# Patient Record
Sex: FEMALE | Race: BLACK OR AFRICAN AMERICAN | Employment: FULL TIME | URBAN - METROPOLITAN AREA
[De-identification: names, ages, dates, MRNs, and addresses within clinical notes are randomized per-mention and may not be internally consistent; named-entity substitution may affect disease eponyms.]

---

## 2017-08-04 ENCOUNTER — HOSPITAL ENCOUNTER (OUTPATIENT)
Dept: MAMMOGRAPHY | Age: 47
Discharge: HOME | End: 2017-08-04
Attending: FAMILY MEDICINE
Payer: COMMERCIAL

## 2017-08-04 DIAGNOSIS — Z12.31 VISIT FOR SCREENING MAMMOGRAM: ICD-10-CM

## 2017-08-04 PROCEDURE — 77067 SCR MAMMO BI INCL CAD: CPT

## 2017-10-09 ENCOUNTER — HOSPITAL ENCOUNTER (OUTPATIENT)
Dept: MAMMOGRAPHY | Age: 47
Discharge: HOME | End: 2017-10-09
Attending: PHYSICIAN ASSISTANT
Payer: COMMERCIAL

## 2017-10-09 ENCOUNTER — HOSPITAL ENCOUNTER (OUTPATIENT)
Dept: ULTRASOUND IMAGING | Age: 47
Discharge: HOME | End: 2017-10-09
Attending: PHYSICIAN ASSISTANT
Payer: COMMERCIAL

## 2017-10-09 DIAGNOSIS — R92.8 ABNORMAL MAMMOGRAM: ICD-10-CM

## 2017-10-09 DIAGNOSIS — N64.89 BREAST ASYMMETRY: ICD-10-CM

## 2017-10-09 PROCEDURE — 77065 DX MAMMO INCL CAD UNI: CPT

## 2017-12-21 ENCOUNTER — HOSPITAL ENCOUNTER (OUTPATIENT)
Dept: ULTRASOUND IMAGING | Age: 47
Discharge: HOME | End: 2017-12-21
Attending: FAMILY MEDICINE
Payer: COMMERCIAL

## 2017-12-21 DIAGNOSIS — N39.0 UTI (URINARY TRACT INFECTION): ICD-10-CM

## 2017-12-21 DIAGNOSIS — R10.9 FLANK PAIN: ICD-10-CM

## 2017-12-21 DIAGNOSIS — R31.9 HEMATURIA: ICD-10-CM

## 2017-12-21 PROCEDURE — 76770 US EXAM ABDO BACK WALL COMP: CPT

## 2017-12-22 ENCOUNTER — HOSPITAL ENCOUNTER (OUTPATIENT)
Dept: GENERAL RADIOLOGY | Age: 47
Discharge: HOME | End: 2017-12-22
Payer: COMMERCIAL

## 2017-12-22 DIAGNOSIS — J90 PLEURAL EFFUSION: ICD-10-CM

## 2017-12-22 DIAGNOSIS — R10.9 FLANK PAIN: ICD-10-CM

## 2017-12-22 PROCEDURE — 71020 XR CHEST PA LAT: CPT

## 2017-12-28 ENCOUNTER — HOSPITAL ENCOUNTER (OUTPATIENT)
Dept: CT IMAGING | Age: 47
Discharge: HOME | End: 2017-12-28
Attending: FAMILY MEDICINE | Admitting: FAMILY MEDICINE
Payer: COMMERCIAL

## 2017-12-28 DIAGNOSIS — R07.9 CHEST PAIN: ICD-10-CM

## 2017-12-28 DIAGNOSIS — J90 PLEURAL EFFUSION: ICD-10-CM

## 2017-12-28 PROCEDURE — 74011636320 HC RX REV CODE- 636/320: Performed by: FAMILY MEDICINE

## 2017-12-28 PROCEDURE — 71275 CT ANGIOGRAPHY CHEST: CPT

## 2017-12-28 RX ADMIN — IOPAMIDOL 100 ML: 755 INJECTION, SOLUTION INTRAVENOUS at 09:05

## 2018-08-07 ENCOUNTER — HOSPITAL ENCOUNTER (OUTPATIENT)
Dept: MAMMOGRAPHY | Age: 48
Discharge: HOME | End: 2018-08-07
Attending: FAMILY MEDICINE
Payer: COMMERCIAL

## 2018-08-07 DIAGNOSIS — Z12.31 VISIT FOR SCREENING MAMMOGRAM: ICD-10-CM

## 2018-08-07 PROCEDURE — 77067 SCR MAMMO BI INCL CAD: CPT

## 2019-01-11 ENCOUNTER — HOSPITAL ENCOUNTER (OUTPATIENT)
Dept: GENERAL RADIOLOGY | Age: 49
Discharge: HOME | End: 2019-01-11
Payer: COMMERCIAL

## 2019-01-11 DIAGNOSIS — M79.622 LEFT UPPER ARM PAIN: ICD-10-CM

## 2019-01-11 PROCEDURE — 73060 X-RAY EXAM OF HUMERUS: CPT

## 2019-06-25 ENCOUNTER — HOSPITAL ENCOUNTER (OUTPATIENT)
Dept: GENERAL RADIOLOGY | Age: 49
Discharge: HOME | End: 2019-06-25
Payer: COMMERCIAL

## 2019-06-25 DIAGNOSIS — M25.562 LEFT KNEE PAIN: ICD-10-CM

## 2019-06-25 DIAGNOSIS — M25.561 RIGHT KNEE PAIN: ICD-10-CM

## 2019-06-25 PROCEDURE — 73564 X-RAY EXAM KNEE 4 OR MORE: CPT

## 2019-07-16 ENCOUNTER — OFFICE VISIT (OUTPATIENT)
Dept: ORTHOPEDIC SURGERY | Facility: CLINIC | Age: 49
End: 2019-07-16

## 2019-07-16 VITALS
OXYGEN SATURATION: 99 % | WEIGHT: 277 LBS | DIASTOLIC BLOOD PRESSURE: 94 MMHG | HEART RATE: 57 BPM | HEIGHT: 70 IN | BODY MASS INDEX: 39.65 KG/M2 | SYSTOLIC BLOOD PRESSURE: 161 MMHG | TEMPERATURE: 98 F | RESPIRATION RATE: 16 BRPM

## 2019-07-16 DIAGNOSIS — M17.0 PRIMARY OSTEOARTHRITIS OF BOTH KNEES: Primary | ICD-10-CM

## 2019-07-16 DIAGNOSIS — M72.2 PLANTAR FASCIITIS OF RIGHT FOOT: ICD-10-CM

## 2019-07-16 RX ORDER — TRIAMCINOLONE ACETONIDE 40 MG/ML
40 INJECTION, SUSPENSION INTRA-ARTICULAR; INTRAMUSCULAR ONCE
Qty: 1 VIAL | Refills: 0
Start: 2019-07-16 | End: 2019-07-16

## 2019-07-16 NOTE — PROGRESS NOTES
Patient: Noelle Muñoz                MRN: [id number]       SSN: [id number]  YOB: 1970        AGE: 51 y.o. SEX: female  Body mass index is 39.75 kg/m². PCP: NAZARIO Levy  07/16/19    Chief Complaint: Bilateral knee pain    HISTORY OF PRESENT ILLNESS:  Lisa Angeles is a 51year-old female who presents to the office today with bilateral knee pain. She rates the pain as 6/10. She has had pain for a few years. She denies any surgeries or specific injuries. She has had x-rays in the past.  She has also had cortisone shots in the past, but none since December. She has not had hyaluronic acid injections. She has pain worse when she walks or stands for long periods of time. She works as a teacher. History reviewed. No pertinent past medical history. History reviewed. No pertinent family history. Current Outpatient Medications   Medication Sig Dispense Refill    triamcinolone acetonide (KENALOG-40) 40 mg/mL injection 1 mL by Intra artICUlar route once for 1 dose. 1 Vial 0       No Known Allergies    History reviewed. No pertinent surgical history.     Social History     Socioeconomic History    Marital status:      Spouse name: Not on file    Number of children: Not on file    Years of education: Not on file    Highest education level: Not on file   Occupational History    Not on file   Social Needs    Financial resource strain: Not on file    Food insecurity:     Worry: Not on file     Inability: Not on file    Transportation needs:     Medical: Not on file     Non-medical: Not on file   Tobacco Use    Smoking status: Never Smoker    Smokeless tobacco: Never Used   Substance and Sexual Activity    Alcohol use: Not on file    Drug use: Not on file    Sexual activity: Not on file   Lifestyle    Physical activity:     Days per week: Not on file     Minutes per session: Not on file    Stress: Not on file   Relationships    Social connections:     Talks on phone: Not on file     Gets together: Not on file     Attends [employer] service: Not on file     Active member of club or organization: Not on file     Attends meetings of clubs or organizations: Not on file     Relationship status: Not on file    Intimate partner violence:     Fear of current or ex partner: Not on file     Emotionally abused: Not on file     Physically abused: Not on file     Forced sexual activity: Not on file   Other Topics Concern    Not on file   Social History Narrative    Not on file       REVIEW OF SYSTEMS:      CON: negative for recent weight loss/gain, fever, or chills  EYE: negative for double or blurry vision  ENT: negative for hoarseness  RS:   negative for cough, URI, SOB  CV:  negative for chest pain, palpitations  GI:    negative for blood in stool, nausea/vomiting  :  negative for blood in urine  MS: As per HPI  Other systems reviewed and noted below. PHYSICAL EXAMINATION:  Visit Vitals  BP (!) 161/94   Pulse (!) 57   Temp 98 °F (36.7 °C) (Oral)   Resp 16   Ht 5' 10\" (1.778 m)   Wt 277 lb (125.6 kg)   SpO2 99%   BMI 39.75 kg/m²     Body mass index is 39.75 kg/m². GENERAL: Alert and oriented x3, in no acute distress, well-developed, well-nourished. HEENT: Normocephalic, atraumatic. RESP: Non labored breathing with equal chest rise on inspiration. CV: Well perfused extremities. No cyanosis or clubbing noted. ABDOMEN: Soft, non-tender, non-distended. PHYSICAL EXAM:  Physical exam of both knees with full knee range of motion. Mild crepitus on the left. No crepitus on the right. Mild tenderness to palpation globally about the knee. No pain with Shaheen's. No instability. No pain with varus or valgus stress. IMAGING:  X-rays were reviewed. These are non-standing x-rays of both knees, which show arthritic changes in both knees. ASSESSMENT AND PLAN:   Ave Cardenas is a 51year-old female with bilateral knee arthritis.   I had a lengthy discussion with her regarding treatment options today. She would like to try another round of cortisone shots into each knee. We will also put in for hyaluronic acid injections if these should fail. I will plan to see her back as needed. She also likely has some right plantar fasciitis. I gave her some information on this, as well as Dr. Bailey Alicia' card.               Electronically signed by: Vince Mohs, MD

## 2019-07-17 NOTE — PROGRESS NOTES
Addendum:  Patient has x rays which show complete loss of medial joint space with osteophyte formation on the left knee and x rays of the right knee show complete loss of medial joint space with osteophyte formation. She has taken NSAIDs for 2-3 years without resolution of her symptoms/pain. Previous cortisone injections given in the past have not lasted longer than 2 months duration for symptoms relief.

## 2019-08-06 ENCOUNTER — OFFICE VISIT (OUTPATIENT)
Dept: ORTHOPEDIC SURGERY | Age: 49
End: 2019-08-06

## 2019-08-06 VITALS
BODY MASS INDEX: 39.22 KG/M2 | WEIGHT: 274 LBS | OXYGEN SATURATION: 99 % | DIASTOLIC BLOOD PRESSURE: 80 MMHG | HEART RATE: 56 BPM | HEIGHT: 70 IN | TEMPERATURE: 97.1 F | RESPIRATION RATE: 16 BRPM | SYSTOLIC BLOOD PRESSURE: 133 MMHG

## 2019-08-06 DIAGNOSIS — S90.111A CONTUSION OF RIGHT GREAT TOE WITHOUT DAMAGE TO NAIL, INITIAL ENCOUNTER: Primary | ICD-10-CM

## 2019-08-06 DIAGNOSIS — M76.61 ACHILLES TENDINITIS OF BOTH LOWER EXTREMITIES: ICD-10-CM

## 2019-08-06 DIAGNOSIS — M76.62 ACHILLES TENDINITIS OF BOTH LOWER EXTREMITIES: ICD-10-CM

## 2019-08-06 DIAGNOSIS — M79.672 PAIN OF LEFT HEEL: ICD-10-CM

## 2019-08-06 DIAGNOSIS — E66.01 SEVERE OBESITY (HCC): ICD-10-CM

## 2019-08-06 DIAGNOSIS — M79.674 GREAT TOE PAIN, RIGHT: ICD-10-CM

## 2019-08-06 RX ORDER — ACETAMINOPHEN 500 MG
TABLET ORAL
COMMUNITY

## 2019-08-06 NOTE — PROGRESS NOTES
I educated patient on calf stretching for her plantar fasciitis. Demonstrated all exercises in the exam room. Due to her toe injury, she is unable to complete heel raises at this time. Fitted for hard sole shoe and instructed on application and use. Advised patient to ice for pain if helpful. Patient verbally understands and consents to treatment plan.

## 2019-08-06 NOTE — PROGRESS NOTES
AMBULATORY PROGRESS NOTE      Patient: Ness Rodriguez             MRN: [id number]     SSN: [id number] Body mass index is 39.31 kg/m². YOB: 1970     AGE: 51 y.o. SEX: female    PCP: NAZARIO Redd     IMPRESSION/DIAGNOSIS AND TREATMENT PLAN     DIAGNOSES  1. Contusion of right great toe without damage to nail, initial encounter    2. Achilles tendinitis of both lower extremities    3. Great toe pain, right    4. Pain of left heel    5. Severe obesity ([address]        Orders Placed This Encounter    AMB SUPPLY ORDER    [73487] Foot Min 3V    [74682] Foot Min 3V      Ness Rodriguez understands her diagnoses and the proposed plan. X-rays of the right foot, AP, lateral, and oblique, show a moderate bunion deformity. No acute fracture, no subluxation, and no dislocation. No osteolytic or osteoblastic lesions are seen on these three views of the right foot. There is some planovalgus alignment to this right foot. Three views of the left foot show planovalgus alignment. No fracture, subluxation, or dislocation. No osteolytic or osteoblastic lesions are seen. There is an incidental bone spur seen to the inferior surface on the right foot lateral radiographic x-ray compared to that on the left side, which shows no incidental bone spur on the left side. Plan:    1) DME Order: Right hard sole shoe. 2) ALBERTO Mojica [employer], has educated the patient on Achilles tendon and plantar fascia exercises and/or stretches. 3) Take OTC Tylenol as needed for pain. 4) Continue activity modification as directed. RTO - 2 weeks     HPI AND EXAMINATION     Ness Rodriguez IS A 51 y.o. female who presents to my outpatient office complaining of right foot pain. Ms. Balta Weiner reports that she is experiencing two issues.  First, she reports that on July 16th, she saw Dr. Sandeep Cardoso and mentioned that she was experiencing pain in her bilateral heels whenever she would wake in the morning. He noted that she might have plantar fasciitis and referred her to me. She indicates that she experiences the pain more over her Achilles tendons rather than her plantar fascia. The patient denies any recent falls, twists, or trauma prior to the onset of her heel pain. She states that she mostly only experiences the pain when she first wakes in the mornings. Then, on the 23rd, the patient states that she stubbed her right great toe on a step. She notes that the swelling has improved, however, she still experiences pain and swelling. She has been elevating and icing her great toe. She has also been taking OTC Tylenol for her pain. She finds that wearing closed toe shoes causes discomfort. 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 XR imaging has been reviewed with the patient. Date of injury: 07/23/2019. The patient has h/o gastric bypass. Visit Vitals  /80   Pulse (!) 56   Temp 97.1 °F (36.2 °C) (Oral)   Resp 16   Ht 5' 10\" (1.778 m)   Wt 274 lb (124.3 kg)   SpO2 99%   BMI 39.31 kg/m²     Appearance: Alert, well appearing and pleasant patient who is in no distress, oriented to person, place/time, and who follows commands. This patient is accompanied in the examination room by her self. Dementia: no dementia  Psychiatric: Affect and mood are appropriate. Patient arrives to office via: without assistive device  HEENT: Head normocephalic & atraumatic. Both pupils are round, non icteric sclera   Eye: EOM are intact and sclera are clear    Neck: ROM WNL and JVD neck is not present     Hearings Intact, does not require hearing aid device  Respiratory: Breathing is unlabored without accessory chest muscle use  Cardiovascular/Peripheral Vascular: Normal Pulses to each foot    ANKLE/FOOT right    Gait: Normal  Tenderness: Moderate tenderness to the great toe, proximal phalanx. NT to the plantar fasciitis at this time. NT to the Achilles tendon at this time.    Cutaneous: Mild swelling to the proximal phalanx of the great toe. Joint Motion: Can DF just to neutral     Gastrocnemius tightness is present  Joint / Tendon Stability: No Ankle or Subtalar instability or joint laxity. No peroneal sublux ability or dislocation  Alignment: Bunion deformity of the bilateral great toes. Neuro Motor/Sensory: NL/NL. Vascular: NL foot/ankle pulses. Lymphatics: No extremity lymphedema, No calf swelling, no tenderness to calf muscles. Left FOOT/ANKLE  Integumentary: No rashes, skin patches, wounds, or abrasions to the right or left legs       Warm and normal color. No regions of expressible drainage. Palpable fullness or mass is not present      Gait: Limp with gait is not present       Tenderness: No PLANTAR FASCIA REGION, with no NODULARITIES at this time        NT to the Achilles tendon      Motor/Strength/Tone Exam: Normal DF, INV,EVERSION. Slightly diminished PF           strength due to plantar fascial tenderness      Sensory Exam:   Intact Normal Sensation to ankle/foot      Stability Testing: No anterolateral or varus instability of the Ankle or Subtalar Joints               No peroneal tendon instability noted      ROM: Normal ROM noted to ankle/foot      Contractures: No Achilles or Gastrocnemius Contractures      Calf tenderness: Absent for calf or gastrocnemius muscle regions       Soft, supple, non tender, non taut lower extremity compartments       Alignment: Neutral Hindfoot  Wounds/Abrasions:   None present  Extremities:   No embolic phenomena to the toes or hands         No significant edema to the foot and or toes.         Lower extremities are warm and appear well perfused    DVT: No evidence of DVT seen on examination at this time    No calf swelling, no tenderness to calf muscles  Lymphatic:  No Evidence of Lymphedema  Vascular: Medial Border of Tibia Region: Edema is not present        Pulses: Dorsalis Pedis &  Posterior Tibial Pulses : Palpable yes        Varicosities Lower Limbs :  None    Neuro: Negative bilateral Straight leg raise (seated position)   no Tinel's at PM NV Bundle Tarsal Canal   no Proximal Tarsal Tunnel Tenderness    no Distal Tarsal Tunnel Tenderness    See Musculoskeletal section for pertinent individual extremity examination    No abnormal hand/wrist, foot/ankle, or facial/neck tremors. CHART REVIEW     No past medical history on file. Current Outpatient Medications   Medication Sig    acetaminophen (TYLENOL) 500 mg tablet Take  by mouth every six (6) hours as needed for Pain. No current facility-administered medications for this visit. No Known Allergies  No past surgical history on file. Social History     Occupational History    Not on file   Tobacco Use    Smoking status: Never Smoker    Smokeless tobacco: Never Used   Substance and Sexual Activity    Alcohol use: Not on file    Drug use: Not on file    Sexual activity: Not on file     No family history on file. REVIEW OF SYSTEMS : 8/6/2019  ALL BELOW ARE Negative except : SEE HPI     Constitutional: Negative for fever, chills and weight loss. Neg Weight Loss  Cardiovascular: Negative for chest pain, claudication and leg swelling. SOB, [place]   Gastrointestinal/Urological: Negative for  pain, N/V/D/C, Blood in stool or urine,dysuria                         Hematuria, Incontinence, pelvic pain  Musculoskeletal: see HPI. Neurological: Negative for dizziness and weakness, headaches,Visual Changes             Confusion,  Or Seizures,   Psychiatric/Behavioral: Negative for depression, memory loss and substance abuse. Extremities:  Negative for hair changes, rash or skin lesion changes. Hematologic: Negative for Bleeding problems, bruising, pallor or swollen lymph nodes.   Peripheral Vascular: No calf pain, vascular vein tenderness to calf pain              No calf throbbing, posterior knee throbbing pain     DIAGNOSTIC IMAGING     No notes on file    Please see above section of this report. I have personally reviewed the results of the above study. The interpretation of this study is my professional opinion. Written by Dolorse Be, as dictated by Dr. Flash Shelton. I, Dr. Flash Shelton, confirm that all documentation is accurate.

## 2019-08-06 NOTE — PATIENT INSTRUCTIONS
Achilles Tendinitis: Exercises     Complete at least 2 sessions of each exercise each day to get started (no days off). If beneficial and able to fit into your schedule, more sessions are recommended. Nothing should cause an increase in pain or swelling. Towel stretch (calf)    1. Sit with your legs extended and knees straight. 2. Place a towel around your foot just under the toes. 3. Hold each end of the towel in each hand, with your hands above your knees. 4. Pull back with the towel so that your foot stretches toward you. 5. Hold the position for 30 seconds. 6. Repeat 3 times a session. 7. When 3 sets are completed, slightly bend the knee by placing a towel or pillow under it and completing 3 more sets per leg. 8. If stretch becomes too easy and you are no longer able to feel a stretch, discontinue exercise in favor of standing stretches. Wall stretch (calf)    1. Stand facing a wall with your hands on the wall at about eye level. Put your affected leg about a step behind your other leg. 2. Keeping your back leg straight and your back heel on the floor, bend your front knee and gently bring your hip and chest toward the wall until you feel a stretch in the calf of your back leg. 3. Hold the stretch for 30 seconds. 4. Repeat 3 times per leg. 5. Repeat steps 1 through 4, but this time keep your back knee bent. Calf stair stretch      1. Standing on a stair so that the edge of the stair hits the midfoot, let one foot drop down below the stair so that a stretch is felt in the back of the lower leg. Keep knee straight. 2. Hold for 30 seconds, 3 times per leg. 3. Repeat the stretch with your knee slightly bent. Heel raises (eccentric emphasis)    1. Stand on a step with your heel off the edge of the step. Hold on to a handrail or wall for balance. 2. Push up on your toes, then slowly count to 5 as you lower yourself back down until your heel is below the step.  If it hurts to push up on your toes, try putting most of your weight on your other foot as you push up, or try using your arms to help you. If you can't do this exercise without causing pain, stop the exercise. 3. Repeat 10 times for 3 sets. If you have any questions, please call [facility] and ask for Natasha Rojo, Certified Athletic Trainer.

## 2019-08-09 ENCOUNTER — HOSPITAL ENCOUNTER (OUTPATIENT)
Dept: MAMMOGRAPHY | Age: 49
Discharge: HOME | End: 2019-08-09
Attending: PHYSICIAN ASSISTANT
Payer: COMMERCIAL

## 2019-08-09 DIAGNOSIS — Z12.31 VISIT FOR SCREENING MAMMOGRAM: ICD-10-CM

## 2019-08-09 PROCEDURE — 77067 SCR MAMMO BI INCL CAD: CPT

## 2019-09-10 ENCOUNTER — OFFICE VISIT (OUTPATIENT)
Dept: ORTHOPEDIC SURGERY | Facility: CLINIC | Age: 49
End: 2019-09-10

## 2019-09-10 VITALS
BODY MASS INDEX: 39.14 KG/M2 | OXYGEN SATURATION: 99 % | SYSTOLIC BLOOD PRESSURE: 149 MMHG | RESPIRATION RATE: 18 BRPM | WEIGHT: 273.4 LBS | TEMPERATURE: 97.3 F | HEART RATE: 55 BPM | HEIGHT: 70 IN | DIASTOLIC BLOOD PRESSURE: 69 MMHG

## 2019-09-10 DIAGNOSIS — M17.0 PRIMARY OSTEOARTHRITIS OF BOTH KNEES: Primary | ICD-10-CM

## 2019-09-10 RX ORDER — HYALURONATE SODIUM 10 MG/ML
2 SYRINGE (ML) INTRAARTICULAR ONCE
Qty: 2 ML | Refills: 0
Start: 2019-09-10 | End: 2019-09-10

## 2019-09-10 NOTE — PROGRESS NOTES
This note has been dictated below. Patient: Manisha Mejia                MRN: [id number]       SSN: [id number]  YOB: 1970        AGE: 51 y.o. SEX: female  Body mass index is 39.23 kg/m². PCP: NAZARIO Reis  09/10/19    History reviewed. No pertinent past medical history. History reviewed. No pertinent surgical history. History reviewed. No pertinent family history.     Social History     Socioeconomic History    Marital status:      Spouse name: Not on file    Number of children: Not on file    Years of education: Not on file    Highest education level: Not on file   Occupational History    Not on file   Social Needs    Financial resource strain: Not on file    Food insecurity:     Worry: Not on file     Inability: Not on file    Transportation needs:     Medical: Not on file     Non-medical: Not on file   Tobacco Use    Smoking status: Never Smoker    Smokeless tobacco: Never Used   Substance and Sexual Activity    Alcohol use: Not on file    Drug use: Not on file    Sexual activity: Not on file   Lifestyle    Physical activity:     Days per week: Not on file     Minutes per session: Not on file    Stress: Not on file   Relationships    Social connections:     Talks on phone: Not on file     Gets together: Not on file     Attends [employer] service: Not on file     Active member of club or organization: Not on file     Attends meetings of clubs or organizations: Not on file     Relationship status: Not on file    Intimate partner violence:     Fear of current or ex partner: Not on file     Emotionally abused: Not on file     Physically abused: Not on file     Forced sexual activity: Not on file   Other Topics Concern    Not on file   Social History Narrative    Not on file       Current Outpatient Medications   Medication Sig Dispense Refill    sodium hyaluronate (SUPARTZ FX/HYALGAN/GENIVSC) 10 mg/mL syrg injection 2 mL by Intra artICUlar route once for 1 dose. 2 mL 0    acetaminophen (TYLENOL) 500 mg tablet Take  by mouth every six (6) hours as needed for Pain. No Known Allergies      REVIEW OF SYSTEMS:      Review of systems unchanged from previous visit except as noted below. PHYSICAL EXAMINATION:  Visit Vitals  /69 (BP 1 Location: Left arm, BP Patient Position: Sitting)   Pulse (!) 55   Temp 97.3 °F (36.3 °C) (Oral)   Resp 18   Ht 5' 10\" (1.778 m)   Wt 273 lb 6.4 oz (124 kg)   SpO2 99% Comment: RA   BMI 39.23 kg/m²     Body mass index is 39.23 kg/m². HISTORY OF PRESENT ILLNESS:  Antonio Escamilla returns to the office today for her knees. She is here today for Euflexxa injection #1 into her bilateral knees. She has no new complaints. PHYSICAL EXAM:  Exam of both knees with no effusion, warmth or erythema. Mild swelling superficially. Full knee range of motion. ASSESSMENT AND PLAN:   Antonio Escamilla underwent Euflexxa injection #1 into both knees today. She tolerated it well. Follow up next week.         [facility]  OFFICE PROCEDURE PROGRESS NOTE        Chart reviewed for the following:   Patrick Hinds MD, have reviewed the History, Physical and updated the Allergic reactions for [address] performed immediately prior to start of procedure:   Patrick Hinds MD, have performed the following reviews on [facility] prior to the start of the procedure:            * Patient was identified by name and date of birth   * Agreement on procedure being performed was verified  * Risks and Benefits explained to the patient  * Procedure site verified and marked as necessary  * Patient was positioned for comfort  * Consent was signed and verified    Time: 1640      Date of procedure: 9/10/2019    Procedure performed by:  Simone Urrutia MD    Provider assisted by: Bennett Robles LPN    Patient assisted by: self    How tolerated by patient: tolerated the procedure well with no complications    Post Procedural Pain Scale: 0 - No Hurt    Comments: none                     Electronically signed by: Lainey Armstrong MD

## 2019-09-10 NOTE — PROGRESS NOTES
1. Have you been to the ER, urgent care clinic since your last visit? Hospitalized since your last visit? No    2. Have you seen or consulted any other health care providers outside of the [address] [place] [address] since your last visit? Include any pap smears or colon screening.  No

## 2019-09-17 ENCOUNTER — OFFICE VISIT (OUTPATIENT)
Dept: ORTHOPEDIC SURGERY | Facility: CLINIC | Age: 49
End: 2019-09-17

## 2019-09-17 VITALS
DIASTOLIC BLOOD PRESSURE: 69 MMHG | TEMPERATURE: 97.3 F | HEART RATE: 58 BPM | SYSTOLIC BLOOD PRESSURE: 139 MMHG | WEIGHT: 273 LBS | RESPIRATION RATE: 16 BRPM | HEIGHT: 70 IN | OXYGEN SATURATION: 99 % | BODY MASS INDEX: 39.08 KG/M2

## 2019-09-17 DIAGNOSIS — M17.0 PRIMARY OSTEOARTHRITIS OF BOTH KNEES: Primary | ICD-10-CM

## 2019-09-17 RX ORDER — HYALURONATE SODIUM 10 MG/ML
2 SYRINGE (ML) INTRAARTICULAR ONCE
Qty: 2 ML | Refills: 0
Start: 2019-09-17 | End: 2019-09-17

## 2019-09-17 RX ORDER — DICLOFENAC SODIUM 10 MG/G
GEL TOPICAL 4 TIMES DAILY
COMMUNITY

## 2019-09-17 NOTE — PROGRESS NOTES
1. Have you been to the ER, urgent care clinic since your last visit? Hospitalized since your last visit? NO    2. Have you seen or consulted any other health care providers outside of the [address] [place] [address] since your last visit? Include any pap smears or colon screening.  NO

## 2019-09-17 NOTE — PROGRESS NOTES
Patient: Manisha Mejia                MRN: [id number]       SSN: [id number]  YOB: 1970        AGE: 51 y.o. SEX: female  Body mass index is 39.17 kg/m². PCP: NAZARIO Reis  09/17/19    Chief Complaint: Euflexxa #2 Bilateral    HISTORY OF PRESENT ILLNESS:  Marlena Steinberg returns to the office for bilateral Euflexxa injections #2. She says the left knee did not get much in the way of relief, but the right knee has felt better. No other issues. History reviewed. No pertinent past medical history. History reviewed. No pertinent family history. Current Outpatient Medications   Medication Sig Dispense Refill    diclofenac (VOLTAREN) 1 % gel Apply  to affected area four (4) times daily.  sodium hyaluronate (SUPARTZ FX/HYALGAN/GENIVSC) 10 mg/mL syrg injection 2 mL by Intra artICUlar route once for 1 dose. 2 mL 0    acetaminophen (TYLENOL) 500 mg tablet Take  by mouth every six (6) hours as needed for Pain. No Known Allergies    History reviewed. No pertinent surgical history.     Social History     Socioeconomic History    Marital status:      Spouse name: Not on file    Number of children: Not on file    Years of education: Not on file    Highest education level: Not on file   Occupational History    Not on file   Social Needs    Financial resource strain: Not on file    Food insecurity:     Worry: Not on file     Inability: Not on file    Transportation needs:     Medical: Not on file     Non-medical: Not on file   Tobacco Use    Smoking status: Never Smoker    Smokeless tobacco: Never Used   Substance and Sexual Activity    Alcohol use: Not on file    Drug use: Not on file    Sexual activity: Not on file   Lifestyle    Physical activity:     Days per week: Not on file     Minutes per session: Not on file    Stress: Not on file   Relationships    Social connections:     Talks on phone: Not on file     Gets together: Not on file Attends Orthodoxy service: Not on file     Active member of club or organization: Not on file     Attends meetings of clubs or organizations: Not on file     Relationship status: Not on file    Intimate partner violence:     Fear of current or ex partner: Not on file     Emotionally abused: Not on file     Physically abused: Not on file     Forced sexual activity: Not on file   Other Topics Concern    Not on file   Social History Narrative    Not on file       REVIEW OF SYSTEMS:      No changes from previous review of systems unless noted. PHYSICAL EXAMINATION:  Visit Vitals  /69 (BP 1 Location: Left arm, BP Patient Position: Sitting)   Pulse (!) 58   Temp 97.3 °F (36.3 °C) (Oral)   Resp 16   Ht 5' 10\" (1.778 m)   Wt 273 lb (123.8 kg)   SpO2 99%   BMI 39.17 kg/m²     Body mass index is 39.17 kg/m². GENERAL: Alert and oriented x3, in no acute distress. HEENT: Normocephalic, atraumatic. RESP: Non labored breathing. SKIN: No rashes or lesions noted. PHYSICAL EXAM:  Physical exam of both knees with no changes. No effusion, warmth or erythema. ASSESSMENT AND PLAN:   Matty Soto is here for Euflexxa injection #2 into both knees. She tolerated it well. Follow up in a week for the third and final shot.         [facility] - [facility]  OFFICE PROCEDURE PROGRESS NOTE        Chart reviewed for the following:   Henri Naik MD, have reviewed the History, Physical and updated the Allergic reactions for [address] performed immediately prior to start of procedure:   Henri Naik MD, have performed the following reviews on [facility] prior to the start of the procedure:            * Patient was identified by name and date of birth   * Agreement on procedure being performed was verified  * Risks and Benefits explained to the patient  * Procedure site verified and marked as necessary  * Patient was positioned for comfort  * Consent was signed and verified    Time: 1700      Date of procedure: 9/17/2019    Procedure performed by:  Nicko Palmer MD    Provider assisted by: Twin Mclaughlin LPN    Patient assisted by: self    How tolerated by patient: tolerated the procedure well with no complications    Post Procedural Pain Scale: 0 - No Hurt    Comments: none                  Electronically signed by: Nicko Palmer MD

## 2019-09-24 ENCOUNTER — OFFICE VISIT (OUTPATIENT)
Dept: ORTHOPEDIC SURGERY | Facility: CLINIC | Age: 49
End: 2019-09-24

## 2019-09-24 VITALS
WEIGHT: 274 LBS | OXYGEN SATURATION: 98 % | HEART RATE: 60 BPM | TEMPERATURE: 98.5 F | RESPIRATION RATE: 16 BRPM | DIASTOLIC BLOOD PRESSURE: 62 MMHG | SYSTOLIC BLOOD PRESSURE: 130 MMHG | BODY MASS INDEX: 39.22 KG/M2 | HEIGHT: 70 IN

## 2019-09-24 DIAGNOSIS — M17.0 PRIMARY OSTEOARTHRITIS OF BOTH KNEES: Primary | ICD-10-CM

## 2019-09-24 RX ORDER — HYALURONATE SODIUM 10 MG/ML
2 SYRINGE (ML) INTRAARTICULAR ONCE
Qty: 2 ML | Refills: 0
Start: 2019-09-24 | End: 2019-09-24

## 2019-09-24 NOTE — PROGRESS NOTES
This note has been dictated below. Patient: Joaquina Fitzpatrick                MRN: [id number]       SSN: [id number]  YOB: 1970        AGE: 51 y.o. SEX: female  Body mass index is 39.31 kg/m². PCP: NAZARIO Farnsworth  09/24/19    History reviewed. No pertinent past medical history. History reviewed. No pertinent surgical history. History reviewed. No pertinent family history.     Social History     Socioeconomic History    Marital status:      Spouse name: Not on file    Number of children: Not on file    Years of education: Not on file    Highest education level: Not on file   Occupational History    Not on file   Social Needs    Financial resource strain: Not on file    Food insecurity:     Worry: Not on file     Inability: Not on file    Transportation needs:     Medical: Not on file     Non-medical: Not on file   Tobacco Use    Smoking status: Never Smoker    Smokeless tobacco: Never Used   Substance and Sexual Activity    Alcohol use: Not on file    Drug use: Not on file    Sexual activity: Not on file   Lifestyle    Physical activity:     Days per week: Not on file     Minutes per session: Not on file    Stress: Not on file   Relationships    Social connections:     Talks on phone: Not on file     Gets together: Not on file     Attends Muslim service: Not on file     Active member of club or organization: Not on file     Attends meetings of clubs or organizations: Not on file     Relationship status: Not on file    Intimate partner violence:     Fear of current or ex partner: Not on file     Emotionally abused: Not on file     Physically abused: Not on file     Forced sexual activity: Not on file   Other Topics Concern    Not on file   Social History Narrative    Not on file       Current Outpatient Medications   Medication Sig Dispense Refill    sodium hyaluronate (SUPARTZ FX/HYALGAN/GENIVSC) 10 mg/mL syrg injection 2 mL by Intra artICUlar route once for 1 dose. 2 mL 0    diclofenac (VOLTAREN) 1 % gel Apply  to affected area four (4) times daily.  acetaminophen (TYLENOL) 500 mg tablet Take  by mouth every six (6) hours as needed for Pain. No Known Allergies      REVIEW OF SYSTEMS:      Review of systems unchanged from previous visit except as noted below. PHYSICAL EXAMINATION:  Visit Vitals  /62 (BP 1 Location: Left arm, BP Patient Position: Sitting)   Pulse 60   Temp 98.5 °F (36.9 °C) (Oral)   Resp 16   Ht 5' 10\" (1.778 m)   Wt 274 lb (124.3 kg)   SpO2 98%   BMI 39.31 kg/m²     Body mass index is 39.31 kg/m². HISTORY OF PRESENT ILLNESS:  Mathieu Chadwick returns today for Euflexxa injection bilaterally #3. She has been doing well following the previous two. No complaints. PHYSICAL EXAM:  Physical exam is unchanged with mild crepitus and mild swelling, but no significant effusion. ASSESSMENT AND PLAN:   Mathieu Chadwick is here for Euflexxa injection #3 bilaterally. She tolerated this well. Aftercare was discussed. Follow up as needed.            [facility]  OFFICE PROCEDURE PROGRESS NOTE        Chart reviewed for the following:   Jeff Deshpande MD, have reviewed the History, Physical and updated the Allergic reactions for [address] performed immediately prior to start of procedure:   Jeff Deshpande MD, have performed the following [facility] prior to the start of the procedure:            * Patient was identified by name and date of birth   * Agreement on procedure being performed was verified  * Risks and Benefits explained to the patient  * Procedure site verified and marked as necessary  * Patient was positioned for comfort  * Consent was signed and verified    Time: 1600      Date of procedure: 9/24/2019    Procedure performed by:  Cee Wolfe MD    Provider assisted by: Gwendolyn Rush LPN    Patient assisted by: self    How tolerated by patient: tolerated the procedure well with no complications    Post Procedural Pain Scale: 0 - No Hurt    Comments: none                  Electronically signed by: Martin Santana MD

## 2019-09-24 NOTE — PROGRESS NOTES
1. Have you been to the ER, urgent care clinic since your last visit? Hospitalized since your last visit? NO    2. Have you seen or consulted any other health care providers outside of the [address] [place] [address] since your last visit? Include any pap smears or colon screening.  NO

## 2020-03-24 ENCOUNTER — OFFICE VISIT (OUTPATIENT)
Dept: ORTHOPEDIC SURGERY | Facility: CLINIC | Age: 50
End: 2020-03-24

## 2020-03-24 VITALS
HEART RATE: 63 BPM | WEIGHT: 286.6 LBS | SYSTOLIC BLOOD PRESSURE: 138 MMHG | DIASTOLIC BLOOD PRESSURE: 82 MMHG | OXYGEN SATURATION: 99 % | RESPIRATION RATE: 16 BRPM | HEIGHT: 70 IN | BODY MASS INDEX: 41.03 KG/M2 | TEMPERATURE: 97.4 F

## 2020-03-24 DIAGNOSIS — M17.0 PRIMARY OSTEOARTHRITIS OF BOTH KNEES: Primary | ICD-10-CM

## 2020-03-24 RX ORDER — TRIAMCINOLONE ACETONIDE 40 MG/ML
40 INJECTION, SUSPENSION INTRA-ARTICULAR; INTRAMUSCULAR ONCE
Qty: 1 VIAL | Refills: 0
Start: 2020-03-24 | End: 2020-03-24

## 2020-03-24 NOTE — PROGRESS NOTES
1. Have you been to the ER, urgent care clinic since your last visit? Hospitalized since your last visit? No    2. Have you seen or consulted any other health care providers outside of the [address] [place] [address] since your last visit? Include any pap smears or colon screening.  No

## 2020-03-24 NOTE — PROGRESS NOTES
Patient: Rehan Son                MRN: [id number]       SSN: [id number]  YOB: 1970        AGE: 51 y.o. SEX: female  Body mass index is 41.12 kg/m². PCP: NAZARIO Bernstein  03/24/20    Chief Complaint: Bilateral knee pain    HPI:  Patient returns to the office today for her bilateral knee pain. Pain has returned following HA shots. Pain 7/10. Continues to try to lose weight and manage knee pain. Pain with stairs and walking. Is trying OTC topical and oral medications for the pain. No new injuries/trauma. No past medical history on file. No family history on file. Current Outpatient Medications   Medication Sig Dispense Refill    triamcinolone acetonide (KENALOG-40) 40 mg/mL injection 1 mL by Intra artICUlar route once for 1 dose. 1 Vial 0    diclofenac (VOLTAREN) 1 % gel Apply  to affected area four (4) times daily.  acetaminophen (TYLENOL) 500 mg tablet Take  by mouth every six (6) hours as needed for Pain. No Known Allergies    No past surgical history on file.     Social History     Socioeconomic History    Marital status:      Spouse name: Not on file    Number of children: Not on file    Years of education: Not on file    Highest education level: Not on file   Occupational History    Not on file   Social Needs    Financial resource strain: Not on file    Food insecurity     Worry: Not on file     Inability: Not on file    Transportation needs     Medical: Not on file     Non-medical: Not on file   Tobacco Use    Smoking status: Never Smoker    Smokeless tobacco: Never Used   Substance and Sexual Activity    Alcohol use: Not on file    Drug use: Not on file    Sexual activity: Not on file   Lifestyle    Physical activity     Days per week: Not on file     Minutes per session: Not on file    Stress: Not on file   Relationships    Social connections     Talks on phone: Not on file     Gets together: Not on file Attends Nondenominational service: Not on file     Active member of club or organization: Not on file     Attends meetings of clubs or organizations: Not on file     Relationship status: Not on file    Intimate partner violence     Fear of current or ex partner: Not on file     Emotionally abused: Not on file     Physically abused: Not on file     Forced sexual activity: Not on file   Other Topics Concern    Not on file   Social History Narrative    Not on file       REVIEW OF SYSTEMS:      No changes from previous review of systems unless noted. PHYSICAL EXAMINATION:  Visit Vitals  /82   Pulse 63   Temp 97.4 °F (36.3 °C) (Oral)   Resp 16   Ht 5' 10\" (1.778 m)   Wt 286 lb 9.6 oz (130 kg)   SpO2 99%   BMI 41.12 kg/m²     Body mass index is 41.12 kg/m². GENERAL: Alert and oriented x3, in no acute distress. HEENT: Normocephalic, atraumatic. RESP: Non labored breathing. SKIN: No rashes or lesions noted. PE: Bilateral knees: Pain with ROM, + Crepitus, Full knee ROM. TTP over medial joint line. Crepitus with PF motion. SILT/NVI distally.   No effusion    Imaging:  X rays taken today show bilateral knee DJD with medial joint narrowing and PF joint DJD    A/P  Bilateral knee DJD  -History of cortisone and HA injections without full relief  Joint space narrowing medial space complete joint loss bilaterally  Discussed weight loss, low impact exercises, oral medications/topical medications at length  Discussed possible knee replacement surgery in the future  Patient would like to continue with conservative management at this time  Knee injections given  Authorization for HA started today  Follow up PRN    Total visit time over 30 minutes    [address] NOTE        Chart reviewed for the following:   IYue MD, have reviewed the History, Physical and updated the Allergic reactions for [address] performed immediately prior to start of procedure:   Maricel Hager MD, have performed the following reviews on Memo Montero prior to the start of the procedure:            * Patient was identified by name and date of birth   * Agreement on procedure being performed was verified  * Risks and Benefits explained to the patient  * Procedure site verified and marked as necessary  * Patient was positioned for comfort  * Consent was signed and verified    Time: 10:14 AM  Bilateral knee    Kenalog 40mg & 3cc Lidocaine    Date of procedure: 3/24/2020    Procedure performed by:  Anna Gutierrez MD    Provider assisted by: Bryant Mensah LPN    Patient assisted by: self    How tolerated by patient: tolerated the procedure well with no complications    Post Procedural Pain Scale: 0 - No Hurt    Comments: none

## 2021-02-25 ENCOUNTER — TRANSCRIBE ORDER (OUTPATIENT)
Dept: SCHEDULING | Age: 51
End: 2021-02-25

## 2021-02-25 DIAGNOSIS — Z12.31 SCREENING MAMMOGRAM, ENCOUNTER FOR: Primary | ICD-10-CM

## 2021-03-05 ENCOUNTER — TRANSCRIBE ORDER (OUTPATIENT)
Dept: SCHEDULING | Age: 51
End: 2021-03-05

## 2021-03-05 DIAGNOSIS — M79.609 LIMB PAIN: Primary | ICD-10-CM

## 2021-03-05 DIAGNOSIS — M79.669 CALF PAIN: ICD-10-CM

## 2021-03-05 DIAGNOSIS — M79.89 LEFT LEG SWELLING: ICD-10-CM

## 2021-03-08 ENCOUNTER — TRANSCRIBE ORDER (OUTPATIENT)
Dept: SCHEDULING | Age: 51
End: 2021-03-08

## 2021-03-08 DIAGNOSIS — N83.209 OVARIAN CYST: Primary | ICD-10-CM

## 2021-03-15 ENCOUNTER — HOSPITAL ENCOUNTER (OUTPATIENT)
Dept: VASCULAR SURGERY | Age: 51
Discharge: HOME | End: 2021-03-15
Attending: FAMILY MEDICINE
Payer: COMMERCIAL

## 2021-03-15 ENCOUNTER — HOSPITAL ENCOUNTER (OUTPATIENT)
Dept: ULTRASOUND IMAGING | Age: 51
Discharge: HOME | End: 2021-03-15
Attending: FAMILY MEDICINE
Payer: COMMERCIAL

## 2021-03-15 DIAGNOSIS — M79.669 CALF PAIN: ICD-10-CM

## 2021-03-15 DIAGNOSIS — M79.89 LEFT LEG SWELLING: ICD-10-CM

## 2021-03-15 DIAGNOSIS — M79.609 LIMB PAIN: ICD-10-CM

## 2021-03-15 DIAGNOSIS — N83.209 OVARIAN CYST: ICD-10-CM

## 2021-03-15 PROCEDURE — 93971 EXTREMITY STUDY: CPT

## 2021-03-15 PROCEDURE — 76856 US EXAM PELVIC COMPLETE: CPT

## 2021-03-17 ENCOUNTER — OFFICE VISIT (OUTPATIENT)
Dept: ORTHOPEDIC SURGERY | Age: 51
End: 2021-03-17
Payer: COMMERCIAL

## 2021-03-17 VITALS
DIASTOLIC BLOOD PRESSURE: 65 MMHG | OXYGEN SATURATION: 98 % | HEART RATE: 61 BPM | HEIGHT: 70 IN | TEMPERATURE: 96 F | SYSTOLIC BLOOD PRESSURE: 127 MMHG | BODY MASS INDEX: 40.06 KG/M2 | RESPIRATION RATE: 16 BRPM | WEIGHT: 279.8 LBS

## 2021-03-17 DIAGNOSIS — M86.9 PERIOSTITIS (HCC): Primary | ICD-10-CM

## 2021-03-17 DIAGNOSIS — M72.2 PLANTAR FASCIA SYNDROME: ICD-10-CM

## 2021-03-17 DIAGNOSIS — M79.671 RIGHT FOOT PAIN: ICD-10-CM

## 2021-03-17 DIAGNOSIS — M79.672 LEFT FOOT PAIN: ICD-10-CM

## 2021-03-17 PROCEDURE — 73630 X-RAY EXAM OF FOOT: CPT | Performed by: ORTHOPAEDIC SURGERY

## 2021-03-17 PROCEDURE — 99213 OFFICE O/P EST LOW 20 MIN: CPT | Performed by: ORTHOPAEDIC SURGERY

## 2021-03-17 NOTE — PATIENT INSTRUCTIONS
[facility] Clinic: [address] [facility] [address] [place], [address] Phone: [phone] See Kiran Montgomery at [facility]. Look into [facility], [facility], [facility], [facility], [facility]

## 2021-03-17 NOTE — LETTER
3/17/2021 Patient: Isabel Garcia YOB: 1970 Date of Visit: 3/17/2021 Frederick Dean, [address] [place] [address] [address] [facility] [address] Via Fax: [phone] Dear NAZARIO Torres, Thank you for referring Ms. Celestina Mcintosh to [address] [place] [address] for evaluation. My notes for this consultation are attached. If you have questions, please do not hesitate to call me. I look forward to following your patient along with you.  
 
 
Sincerely, 
 
Aminta Andujar MD

## 2021-03-17 NOTE — PROGRESS NOTES
AMBULATORY PROGRESS NOTE      Patient: Nikky Luther             MRN: [id number]     SSN: [id number] Body mass index is 40.15 kg/m².  YOB: 1970     AGE: 50 y.o.       EX: female    PCP: Basil Orta PA       IMPRESSION //  DIAGNOSIS AND TREATMENT PLAN        Nikky Luther has is having point tenderness to each heel, on the inferior portion of each calcaneus on calcaneal tuberosity, as well as to the plantar fashion on each side.  Impression is calcaneal bone pain, periostitis, noninfectious, with a component of plantar fasciitis.  Recommendation comes for custom orthotic.  We will try a viscoelastic heel insert initially, but I informed her that the instrument viscoelastic insert may only help her temporarily and I think she is at offloading type of orthotic to be most efficacious for her symptoms.    DIAGNOSES  1. Periostitis (HCC)    2. Plantar fascia syndrome    3. Right foot pain    4. Left foot pain        Orders Placed This Encounter   • AMB SUPPLY ORDER     Custom Trilaminar Orthotic Inserts calcaneous with metatarsal pad to offload heel.   • Generic Supply Order     Rubber heel   • AMB POC XRAY, FOOT; COMPLETE, 3+ VIEW     ASK ALL FEMALE PATIENTS IF PREGNANT     Order Specific Question:   Reason for Exam     Answer:   PAIN     Order Specific Question:   Is Patient Pregnant?     Answer:   Unknown   • AMB POC XRAY, FOOT; COMPLETE, 3+ VIEW     ASK ALL FEMALE PATIENTS IF PREGNANT     Order Specific Question:   Reason for Exam     Answer:   PAIN     Order Specific Question:   Is Patient Pregnant?     Answer:   Unknown        PLAN:      1. DME order: Custom trilaminar orthotic calcaneous with metatarsal pad to offload heel.  2. DME: rubber heel insert given in office.      RTO-  6 weeks    Nikky Luther  expresses understanding of the diagnosis, treatment plan, and all of their proposed questions were answered to their satisfaction. Patient education has  been provided re the diagnoses. Dayron Olvera may have a reminder for a \"due or due soon\" health maintenance. I have asked that she contact her primary care provider for follow-up on this health maintenance. HPI //  [address] Salomon Mendez IS A 53 y.o. female who is a/an  established patient , presenting to my outpatient office for evaluation of  the following chief complaint(s):     Chief Complaint   Patient presents with    Foot Pain     rohan foot pain       Patient presents with bilateral heel pain ongoing for over 3 weeks. Denies injuries or traumas. Pt communicates that she has to walk on the tips of her toes as she has lateral hindfoot and lateral heel pain and pressure with weight bearing activities. She is using shoe inserts to help alleviate her pain some with mild benefit. Pt endorses burning in her heels as well. She has benefit with prolonged rest and Tylenol. Pt also reports some benefit with Megan oil when her pain first began. Pt works for [employer] as a . 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 Visit Vitals  /65 (BP 1 Location: Right arm, BP Patient Position: Sitting, BP Cuff Size: Large adult)   Pulse 61   Temp (!) 96 °F (35.6 °C) (Temporal)   Resp 16   Ht 5' 10\" (1.778 m)   Wt 279 lb 12.8 oz (126.9 kg)   SpO2 98%   BMI 40.15 kg/m²       Appearance: Alert, well appearing and pleasant patient who is in no distress, oriented to person, place/time, and who follows commands. This patient is accompanied in the examination room by her  self. There is signs of: no dementia  Psychiatric: Affect/mood are appropriate. Speech normal in context and clarity, memory intact grossly, no involuntary movements - tremors. Patient arrives to office via: without assistive device  H EENT (2): Head normocephalic & atraumatic.  Both pupils are round     Eye: EOM are intact // Neck: ROM WNL  //  Hearings Intact   Respiratory: Breathing non labored     ANKLE/FOOT bilateral     Gait: normal  Tenderness: mild to posteromedial tendon with weightbearing, mild tenderness to the plantar lateral portion of right calcaneus and left calcaneus calcaneal tuberosities. Mild tenderness of plantar medial fascial origin on each side. Cutaneous: WNL  Joint Motion: WNL  Joint / Tendon Stability: No Ankle or Subtalar instability or joint laxity. No peroneal sublux ability or dislocation  Alignment: neutral Hindfoot,    Neuro Motor/Sensory: NL/NL  Vascular: NL foot/ankle pulses,   Lymphatics: No extremity lymphedema, No calf swelling, no tenderness to calf muscles. CHART REVIEW     Mignon Rock has been experiencing pain and discomfort confirmed as outlined in the pain assessment outlined below.  was reviewed by Claudeen Lapidus, MD on 3/17/2021. Pain Assessment  3/17/2021   Location of Pain Foot   Pain Location Comment -   Location Modifiers Right;Left   Severity of Pain 3   Quality of Pain Aching;Dull; [place]; Throbbing;Burning   Quality of Pain Comment tender   Duration of Pain Persistent   Frequency of Pain Constant   Date Pain First Started (No Data)   Aggravating Factors Walking;Standing;Stairs   Aggravating Factors Comment -   Limiting Behavior -   Relieving Factors Rest   Relieving Factors Comment -   Result of Injury No   Work-Related Injury -   Type of Injury -        Mignon Rock  has no past medical history on file. Patients is employed at:   [employer]      History reviewed. No pertinent past medical history. History reviewed. No pertinent surgical history. Current Outpatient Medications   Medication Sig    diclofenac (VOLTAREN) 1 % gel Apply  to affected area four (4) times daily.  acetaminophen (TYLENOL) 500 mg tablet Take  by mouth every six (6) hours as needed for Pain.      No current facility-administered medications for this visit. No Known Allergies  Social History     Occupational History    Not on file   Tobacco Use    Smoking status: Never Smoker    Smokeless tobacco: Never Used   Substance and Sexual Activity    Alcohol use: Not on file    Drug use: Not on file    Sexual activity: Not on file     History reviewed. No pertinent family history. DIAGNOSTIC LAB DATA      Lab Results   Component Value Date/Time    Hemoglobin A1c 5.8 03/25/2010 12:00 PM    //   Lab Results   Component Value Date/Time    Glucose 82 04/09/2011 10:00 AM        No results found for: [id number], [id number]      Lab Results   Component Value Date/Time    Vitamin D 25-Hydroxy 14 (L) 04/09/2011 11:17 AM         REVIEW OF SYSTEMS : 3/17/2021  ALL BELOW ARE Negative except : SEE HPI     All other systems reviewed and are negative. 12 point review of systems otherwise negative unless noted in HPI. DIAGNOSTIC IMAGING       FOOT X RAYS 3 VIEWS LEFT  3/17/2021    NON WEIGHT BEARING    X RAYS AT [facility]  3/17/2021      Bones: No fractures or dislocations. No focal osteolytic or osteoblastic process     Bone Spurs: No significant bone spurs  Foot Alignment: WNL  Joint Condition: No Significant OA  Soft Tissues: Normal, No radiopaque foreign body and No abnormal calcific densities to soft tissues   No ankle joint effusion in lateral projection. Mineralization: Suggests  no Osteopenia    I have personally reviewed the results of the above study and the interpretation of this study is my professional opinion         FOOT X RAYS 3 VIEWS Right   3/17/2021    NON WEIGHT BEARING    X RAYS AT [facility]  3/17/2021      Bones: No fractures or dislocations.  No focal osteolytic or osteoblastic process     Bone Spurs: No significant bone spurs  Foot Alignment: WNL  Joint Condition: No Significant OA  Soft Tissues: Normal, No radiopaque foreign body and No abnormal calcific densities to soft tissues   No ankle joint effusion in lateral projection. Mineralization: Suggests  no Osteopenia    I have personally reviewed the results of the above study and the interpretation of this study is my professional opinion              On this date 03/17/2021 I have spent 35 minutes reviewing previous notes, test results and face to face with the patient discussing the diagnosis and importance of compliance with the treatment plan as well as documenting on the day of the visit. An electronic signature was used to authenticate this note. Aubree Mcneal MD  3/17/2021  1:25 PM      Disclaimer: Sections of this note are dictated using utilizing voice recognition software, which may have resulted in some phonetic based errors in grammar and contents. Even though attempts were made to correct all the mistakes, some may have been missed, and remained in the body of the document. If questions arise, please contact our department. Aubree Mcneal MD  3/17/2021  1:25 PM          Written by Cheng Valderrama, as dictated by Aubree Mcneal MD.   I, Dr. Aubree Mcneal, confirm that all documentation is accurate.

## 2021-03-19 ENCOUNTER — TELEPHONE (OUTPATIENT)
Dept: ORTHOPEDIC SURGERY | Age: 51
End: 2021-03-19

## 2021-03-19 NOTE — TELEPHONE ENCOUNTER
Patient is waiting for a rubber heel insert that we are supposed to be receiving in a new shipment. I checked with  and clinical and it had not arrived as of yet. Please notify patient once available.       Patient [phone]

## 2021-03-19 NOTE — TELEPHONE ENCOUNTER
A pair of LG Visco Heel Inserts were placed up front at [facility].  Patient made aware

## 2021-03-24 ENCOUNTER — TELEPHONE (OUTPATIENT)
Dept: ORTHOPEDIC SURGERY | Age: 51
End: 2021-03-24

## 2021-03-24 NOTE — TELEPHONE ENCOUNTER
Please provide work as requested.     Renetta Parson PA-C, [facility]  3/24/2021   3:29 PM

## 2021-03-24 NOTE — TELEPHONE ENCOUNTER
Patient is requesting a work note to reflect she needs to be able to wear soft soled shoes such as tennis shoes and no prolonged standing.      Please advise at [phone]

## 2021-03-24 NOTE — LETTER
NOTIFICATION RETURN TO WORK / SCHOOL 
 
3/24/2021 3:30 PM 
 
Ms. Magdalena Borja [facility] [id number] To Whom It May Concern: 
 
Magdalena Monk is currently under the care of Chevy Palmer. Please allow her to wear soft sole shoe I.e tennis shoes to work until her follow up appointment. If there are questions or concerns please have the patient contact our office.  
 
 
 
Sincerely, 
 
 
Ninoska Ramírez MD

## 2021-04-05 ENCOUNTER — TRANSCRIBE ORDER (OUTPATIENT)
Dept: SCHEDULING | Age: 51
End: 2021-04-05

## 2021-04-05 DIAGNOSIS — Z12.31 VISIT FOR SCREENING MAMMOGRAM: Primary | ICD-10-CM

## 2021-05-12 ENCOUNTER — HOSPITAL ENCOUNTER (OUTPATIENT)
Dept: MAMMOGRAPHY | Age: 51
Discharge: HOME | End: 2021-05-12
Attending: FAMILY MEDICINE
Payer: COMMERCIAL

## 2021-05-12 DIAGNOSIS — Z12.31 VISIT FOR SCREENING MAMMOGRAM: ICD-10-CM

## 2021-05-12 PROCEDURE — 77063 BREAST TOMOSYNTHESIS BI: CPT

## 2022-03-19 PROBLEM — E66.01 SEVERE OBESITY (HCC): Status: ACTIVE | Noted: 2019-08-06

## 2022-06-15 ENCOUNTER — TELEPHONE (OUTPATIENT)
Dept: PHYSICAL THERAPY | Age: 52
End: 2022-06-15

## 2022-06-15 ENCOUNTER — OFFICE VISIT (OUTPATIENT)
Dept: ORTHOPEDIC SURGERY | Age: 52
End: 2022-06-15
Payer: COMMERCIAL

## 2022-06-15 VITALS — TEMPERATURE: 97.7 F

## 2022-06-15 DIAGNOSIS — M72.2 PLANTAR FASCIITIS, BILATERAL: ICD-10-CM

## 2022-06-15 DIAGNOSIS — M72.2 PLANTAR FASCIA SYNDROME: Primary | ICD-10-CM

## 2022-06-15 PROCEDURE — 99214 OFFICE O/P EST MOD 30 MIN: CPT | Performed by: ORTHOPAEDIC SURGERY

## 2022-06-15 RX ORDER — DICLOFENAC SODIUM 75 MG/1
75 TABLET, DELAYED RELEASE ORAL 2 TIMES DAILY WITH MEALS
Qty: 60 TABLET | Refills: 0 | Status: SHIPPED | OUTPATIENT
Start: 2022-06-15

## 2022-06-15 NOTE — PROGRESS NOTES
Earnest Burrell  1970   Chief Complaint   Patient presents with    Foot Pain     PATTIE        HISTORY OF PRESENT ILLNESS  Earnest Burrell is a 47 y.o. female who presents today for reevaluation of bilateral foot pain. She has been seeing Dr. Rhona Thompson since March for this problem. She has worn her custom orthotics since her last visit. She reports limited relief from this. She takes tylenol and occasional ibuprofen which only help a little. She had gastric bypass and can't take NSAIDs. She works in [employer] and is on her feet walking hard floors all day. When she wakes up in the morning her pain is much worse. The pain starts on the lateral base of her heel and radiates to the medial aspect of her heel. She has to walk on her tip toes in the morning just to get around. R>L. She has also noticed some ankle pain lately as well. She has not tried PT or night splints. Patient denies any fever, chills, chest pain, shortness of breath or calf pain. There are no new illness or injuries to report since last seen in the office. No changes in medications, allergies, social or family history. PHYSICAL EXAM:   Visit Vitals  Temp 97.7 °F (36.5 °C) (Temporal)     The patient is a well-developed, well-nourished female   in no acute distress. The patient is alert and oriented times three. Mood and affect are normal.  LYMPHATIC: lymph nodes are not enlarged and are within normal limits  SKIN: normal in color and non tender to palpation. There are no bruises or abrasions noted. NEUROLOGICAL: Motor sensory exam is within normal limits. Reflexes are equal bilaterally.  There is normal sensation to pinprick and light touch  MUSCULOSKELETAL:    Examination Left Ankle/Foot Right Ankle/Foot   Skin Intact Intact   Swelling - -   Dorsiflexion 10 10   Plantarflexion 25 25   Deformity - -   Inversion laxity - -   Anterior drawer - -   Medial tenderness - -   Lateral tenderness - -   Heel cord Intact Intact Sensation Intact Intact   Bunion - -   Toe nails Normal Normal   Capillary refill Normal Normal     Neg ttp bilaterally, pt very flat footed       IMAGING: no new imaging today    IMPRESSION:      ICD-10-CM ICD-9-CM    1. Plantar fascia syndrome  M72.2 728.71 REFERRAL TO PHYSICAL THERAPY      AMB SUPPLY ORDER   2. Plantar fasciitis, bilateral  M72.2 728.71         PLAN:   Pt having bilateral foot pain  I think this pain is coming from plantar fascitis. I have recommended night splints and PT for this. Will give her a short course of NSAIDs to take while she is on vacation to help with pain. She will continue the custom orthotics as needed for discomfort. Also recommended rolling a frozen water bottle occasionally to help with pain. RTC 4-6 weeks with Dr. Beena Lofton if pain persists.       Magen Dumont [facility]

## 2022-06-24 ENCOUNTER — HOSPITAL ENCOUNTER (OUTPATIENT)
Dept: PHYSICAL THERAPY | Age: 52
Discharge: HOME | End: 2022-06-24
Payer: COMMERCIAL

## 2022-06-24 PROCEDURE — 97161 PT EVAL LOW COMPLEX 20 MIN: CPT

## 2022-06-24 NOTE — PROGRESS NOTES
[facility] - [place] [address]  [address], [address] [phone] fax    Plan of Care/ Statement of Necessity for Physical Therapy Services           Patient name: Mundo Scott Start of Care: 2022   [facility] 1970    Medical Diagnosis: Left foot pain [M79.672]  Right foot pain [M79.671]  Payor: Kevin Reagan / Plan: [place] [employer] / Product Type: PPO /  Onset Date: 2022    Treatment Diagnosis: bilateral foot pain (left >right)   Prior Hospitalization: see medical history Provider#: [id number]   Medications: Verified on Patient summary List    Comorbidities: Arthritis   Prior Level of Function: Independent with all ADL's, able to tolerate prolonged standing/work duties. The Plan of Care and following information is based on the information from the initial evaluation. Assessment/ key information:   Patient is a 47 y. o.female who presents to PT with Left foot pain [M79.672]  Right foot pain [M79.671] with insidious onset since 2022. No traumatic involvement was reported. Pt reports increased burning in right foot > than left. However, neurological screening was negative. Pt advised due to her work schedule 1x/wk would work better for her. Pt currently exhibits decreased strength, ROM, decreased transfer abilities, stair negotiation, and balance deficits. The patient will benefit from skilled PT services to address those deficits and facilitate return to premorbid activity level and improved quality of life.     Evaluation Complexity History LOW Complexity : Zero comorbidities / personal factors that will impact the outcome / POC; Examination LOW Complexity : 1-2 Standardized tests and measures addressing body structure, function, activity limitation and / or participation in recreation  ;Presentation LOW Complexity : Stable, uncomplicated  ;Clinical Decision Making MEDIUM Complexity : FOTO score of 26-74  Overall Complexity Rating: LOW   Problem List: pain affecting function, decrease ROM, decrease strength, edema affecting function, impaired gait/ balance, decrease ADL/ functional abilitiies, decrease activity tolerance, decrease flexibility/ joint mobility and decrease transfer abilities   Treatment Plan may include any combination of the following: Therapeutic exercise, Therapeutic activities, Neuromuscular re-education, Physical agent/modality, Gait/balance training, Manual therapy, Patient education, Self Care training, Functional mobility training, Home safety training and Stair training  Patient / Family readiness to learn indicated by: asking questions, trying to perform skills and interest  Persons(s) to be included in education: patient (P)  Barriers to Learning/Limitations: None  Patient Goal (s): Be able to walk again  Patient Self Reported Health Status: good  Rehabilitation Potential: good    Short Term Goals: To be accomplished in 2 weeks:  1. Pt will report compliance with HEP in order to increase functional tolerance to prolonged/reptitive ADL's               Eval: issued  2. Pt will report <5/10 pain on average on the NPRS in order to tolerate prolonged standing               Eval: 9/10  3. Pt will achieve bilateral ankle dorsiflexion of 10 degrees in order to tolerate prolonged walking              Eval: right: 0 degrees, left: 2 degrees  Long Term Goals: To be accomplished in 4 weeks:  1. Pt will score 66 on FOTO in order to return to PLOF               Eval: 58  2. Pt will score 5/5 on bilateral hip flexion MMT in order to tolerate repetitive/prolonged transfers               Eval: 4-/5 bilaterally   3. Pt will report <1/10 pain on average on the NPRS in order to tolerate prolonged walking                Eval: 9/10  4.  Pt will report ability to tolerate 1 hour standing with minimal discomfort in order to tolerate working duties               Eval: Reports being able to tolerate 30 minute standing       Frequency / Duration: Patient to be seen 1 times per week for 4 weeks. Patient/ Caregiver education and instruction: Diagnosis, prognosis, self care, activity modification and exercises   [x]  Plan of care has been reviewed with ANASTASIYA Collins, PT 6/24/2022 3:09 PM    ________________________________________________________________________    I certify that the above Therapy Services are being furnished while the patient is under my care. I agree with the treatment plan and certify that this therapy is necessary.     Physician's Signature:____________Date:_________TIME:________     NAZARIO Lisa  ** Signature, Date and Time must be completed for valid certification **    Please sign and return to [facility] [address]  [address]   [facility], [address] [phone] fax

## 2022-06-24 NOTE — PROGRESS NOTES
PT DAILY TREATMENT NOTE     Patient Name: Joe George  Date:2022  : 1970  [x]  Patient  Verified  Payor: Samia Valero / Plan: [place] [employer] / Product Type: PPO /    In time: 3:07  Out time: 3:55  Total Treatment Time (min): 48  Visit #: 1 of 4    Treatment Area: Left foot pain [M79.672]  Right foot pain [M79.671]    SUBJECTIVE  Pain Level (0-10 scale): 8/10  Any medication changes, allergies to medications, adverse drug reactions, diagnosis change, or new procedure performed?: [x] No    [] Yes (see summary sheet for update)  Subjective functional status/changes:   [] No changes reported  Pt states, \"I want to be able to walk again\"    OBJECTIVE    48 min [x]Eval                  []Re-Eval             With   [] TE   [x] TA   [] neuro   [] other: Patient Education: [x] Review HEP    [] Progressed/Changed HEP based on:   [x] positioning   [x] body mechanics   [] transfers   [] heat/ice application    [] other:      Other Objective/Functional Measures: See eval     Pain Level (0-10 scale) post treatment: 8/10    ASSESSMENT/Changes in Function: See POC    Patient will continue to benefit from skilled PT services to modify and progress therapeutic interventions, address functional mobility deficits, address ROM deficits, address strength deficits, analyze and address soft tissue restrictions, analyze and cue movement patterns, analyze and modify body mechanics/ergonomics, assess and modify postural abnormalities, address imbalance/dizziness and instruct in home and community integration to attain remaining goals. [x]  See Plan of Care  []  See progress note/recertification  []  See Discharge Summary         Progress towards goals / Updated goals:  Short Term Goals: To be accomplished in 2 weeks:  1. Pt will report compliance with HEP in order to increase functional tolerance to prolonged/reptitive ADL's               Eval: issued  2.  Pt will report <5/10 pain on average on the NPRS in order to tolerate prolonged standing               Eval: 9/10  3. Pt will achieve bilateral ankle dorsiflexion of 10 degrees in order to tolerate prolonged walking              Eval: right: 0 degrees, left: 2 degrees  Long Term Goals: To be accomplished in 4 weeks:  1. Pt will score 66 on FOTO in order to return to PLOF               Eval: 58  2. Pt will score 5/5 on bilateral hip flexion MMT in order to tolerate repetitive/prolonged transfers               Eval: 4-/5 bilaterally   3. Pt will report <1/10 pain on average on the NPRS in order to tolerate prolonged walking                Eval: 9/10  4.  Pt will report ability to tolerate 1 hour standing with minimal discomfort in order to tolerate working duties               Eval: Reports being able to tolerate 30 minute standing      PLAN  [x]  Upgrade activities as tolerated     [x]  Continue plan of care  []  Update interventions per flow sheet       []  Discharge due to:_  []  Other:_      Dayanara Corey, PT 6/24/2022  3:09 PM    Future Appointments   Date Time Provider Billie Harti   6/29/2022  3:30 PM Farheen Vela MD [facility] BS AMB   7/26/2022  4:00 PM Shirlene Méndez MD [facility] BS AMB

## 2022-06-30 ENCOUNTER — HOSPITAL ENCOUNTER (OUTPATIENT)
Dept: PHYSICAL THERAPY | Age: 52
Discharge: HOME | End: 2022-06-30
Payer: COMMERCIAL

## 2022-06-30 PROCEDURE — 97530 THERAPEUTIC ACTIVITIES: CPT

## 2022-06-30 PROCEDURE — 97112 NEUROMUSCULAR REEDUCATION: CPT

## 2022-06-30 PROCEDURE — 97110 THERAPEUTIC EXERCISES: CPT

## 2022-06-30 NOTE — PROGRESS NOTES
PT DAILY TREATMENT NOTE     Patient Name: Mason Artis  Date:2022  : 1970  [x]  Patient  Verified  Payor: Zion Lung / Plan: [place] [employer] PPO / Product Type: PPO /    In time:217  Out time:315  Total Treatment Time (min): 62  Visit #: 2 of 4    Treatment Area: Left foot pain [M79.672]  Right foot pain [M79.671]    SUBJECTIVE  Pain Level (0-10 scale): 6  Any medication changes, allergies to medications, adverse drug reactions, diagnosis change, or new procedure performed?: [x] No    [] Yes (see summary sheet for update)  Subjective functional status/changes:   [] No changes reported  Patient reports it's her right foot and knee that hurt more.     OBJECTIVE    Modality rationale: decrease inflammation and decrease pain to improve the patients ability to perform ADLs   Min Type Additional Details    [] Estim:  []Unatt       []IFC  []Premod                        []Other:  []w/ice   []w/heat  Position:  Location:    [] Estim: []Att    []TENS instruct  []NMES                    []Other:  []w/US   []w/ice   []w/heat  Position:  Location:    []  Traction: [] Cervical       []Lumbar                       [] Prone          []Supine                       []Intermittent   []Continuous Lbs:  [] before manual  [] after manual    []  Ultrasound: []Continuous   [] Pulsed                           []1MHz   []3MHz W/cm2:  Location:    []  Iontophoresis with dexamethasone         Location: [] Take home patch   [] In clinic   10 [x]  Ice     []  heat  []  Ice massage  []  Laser   []  Anodyne Position: supine with wedge  Location: B heels    []  Laser with stim  []  Other:  Position:  Location:    []  Vasopneumatic Device    []  Right     []  Left  Pre-treatment girth:  Post-treatment girth:  Measured at (location):  Pressure:       [] lo [] med [] hi   Temperature: [] lo [] med [] hi   [x] Skin assessment post-treatment:  [x]intact []redness- no adverse reaction    []redness - adverse reaction:     13 min Therapeutic Exercise:  [x] See flow sheet :   Rationale: increase ROM and increase strength to improve the patients ability to increase activity tolerance    25 min Therapeutic Activity:  [x]  See flow sheet : pt ed on plantar fasciitis and factors which contribute to pain, update HEP, standing functional hip strength   Rationale: increase ROM, increase strength and improve coordination  to improve the patients ability to improve standing and ambulation tolerance     10 min Neuromuscular Re-education:  [x]  See flow sheet : ankle mm re-ed, foot intrinsics   Rationale: increase strength, improve coordination, improve balance and increase proprioception  to improve the patients ability to tolerate sustained postures       With   [] TE   [] TA   [] neuro   [] other: Patient Education: [x] Review HEP    [] Progressed/Changed HEP based on:   [] positioning   [] body mechanics   [] transfers   [] heat/ice application    [] other:      Other Objective/Functional Measures: Initiated exercises per flow sheet     Pain Level (0-10 scale) post treatment: 6    ASSESSMENT/Changes in Function: Patient able to initiate exercise program per POC without increase in symptoms. She reports continued heel pain and is beginning to have ankle pain as well. Educated patient on plantar fasciitis and factors contributing to pain. Advised patient to wear shoes with arch support to help manage pain. Updated HEP with resisted ankle 4 way and provided TB and instruction. Patient will continue to benefit from skilled PT services to modify and progress therapeutic interventions, address functional mobility deficits, address ROM deficits, address strength deficits, analyze and address soft tissue restrictions, analyze and cue movement patterns, analyze and modify body mechanics/ergonomics, assess and modify postural abnormalities, address imbalance/dizziness and instruct in home and community integration to attain remaining goals.      []  See Plan of Care  []  See progress note/recertification  []  See Discharge Summary         Progress towards goals / Updated goals:  Short Term Goals: To be accomplished in 2 weeks:  1. Pt will report compliance with HEP in order to increase functional tolerance to prolonged/reptitive ADL's               Eval: issued   Reports compliance  2. Pt will report <5/10 pain on average on the NPRS in order to tolerate prolonged standing               Eval: 9/10  3. Pt will achieve bilateral ankle dorsiflexion of 10 degrees in order to tolerate prolonged walking              Eval: right: 0 degrees, left: 2 degrees  Long Term Goals: To be accomplished in 4 weeks:  1. Pt will score 66 on FOTO in order to return to PLOF               Eval: 58   To be reassessed at end of POC  2. Pt will score 5/5 on bilateral hip flexion MMT in order to tolerate repetitive/prolonged transfers               Eval: 4-/5 bilaterally   3. Pt will report <1/10 pain on average on the NPRS in order to tolerate prolonged walking                Eval: 9/10  4.  Pt will report ability to tolerate 1 hour standing with minimal discomfort in order to tolerate working duties               Eval: Reports being able to tolerate 30 minute standing      PLAN  [x]  Upgrade activities as tolerated     [x]  Continue plan of care   []  Update interventions per flow sheet       []  Discharge due to:_  []  Other:_      Kanchan Villanueva PTA 6/30/2022  2:24 PM    Future Appointments   Date Time Provider Billie Mackey   7/5/2022  5:15 PM Claire Cage, PT [facility]   7/13/2022  5:15 PM Dyana Soto PT [facility]   7/19/2022  5:15 PM Claire Cage, PT [facility]   7/25/2022  5:15 PM Claire Cage, PT [facility]   8/9/2022  9:15 AM Jordan Christensen MD [facility] BS AMB

## 2022-07-05 ENCOUNTER — HOSPITAL ENCOUNTER (OUTPATIENT)
Dept: PHYSICAL THERAPY | Age: 52
Discharge: HOME | End: 2022-07-05
Payer: COMMERCIAL

## 2022-07-05 PROCEDURE — 97112 NEUROMUSCULAR REEDUCATION: CPT

## 2022-07-05 PROCEDURE — 97530 THERAPEUTIC ACTIVITIES: CPT

## 2022-07-05 PROCEDURE — 97110 THERAPEUTIC EXERCISES: CPT

## 2022-07-05 NOTE — PROGRESS NOTES
PT DAILY TREATMENT NOTE     Patient Name: Christiana Ivan  Date:2022  : 1970  [x]  Patient  Verified  Payor: Jose Frost / Plan: [place] [employer] PPO / Product Type: PPO /    In time: 5:13  Out time: 5:59  Total Treatment Time (min): 46  Visit #: 3 of 4    Medicare/BCBS Only   Total Timed Codes (min):  41 1:1 Treatment Time:  41       Treatment Area: Left foot pain [M79.672]  Right foot pain [M79.671]    SUBJECTIVE  Pain Level (0-10 scale): 7/10  Any medication changes, allergies to medications, adverse drug reactions, diagnosis change, or new procedure performed?: [x] No    [] Yes (see summary sheet for update)  Subjective functional status/changes:   [] No changes reported  Pt states, \"I had a long day today, so my heel is hurting\"    OBJECTIVE    Modality rationale: decrease edema, decrease inflammation and decrease pain to improve the patients ability to tolerate ADL's   Min Type Additional Details    [] Estim:  []Unatt       []IFC  []Premod                        []Other:  []w/ice   []w/heat  Position:  Location:    [] Estim: []Att    []TENS instruct  []NMES                    []Other:  []w/US   []w/ice   []w/heat  Position:  Location:    []  Traction: [] Cervical       []Lumbar                       [] Prone          []Supine                       []Intermittent   []Continuous Lbs:  [] before manual  [] after manual    []  Ultrasound: []Continuous   [] Pulsed                           []1MHz   []3MHz W/cm2:  Location:    []  Iontophoresis with dexamethasone         Location: [] Take home patch   [] In clinic   5 [x]  Ice     []  heat  []  Ice massage  []  Laser   []  Anodyne Position: supine  Location: bilateral heels     []  Laser with stim  []  Other:  Position:  Location:    []  Vasopneumatic Device    []  Right     []  Left  Pre-treatment girth:  Post-treatment girth:  Measured at (location):  Pressure:       [] lo [] med [] hi   Temperature: [] lo [] med [] hi   [] Skin assessment post-treatment:  []intact []redness- no adverse reaction    []redness - adverse reaction:     10 min Therapeutic Exercise:  [x] See flow sheet :   Rationale: increase ROM, increase strength, improve coordination and increase proprioception to improve the patients ability to return to PLOF    14 min Therapeutic Activity:  [x]  See flow sheet : 4-way ankle, TRX squats, knee to wall    Rationale: increase strength, improve coordination and increase proprioception  to improve the patients ability to tolerate prolonged walking      17 min Neuromuscular Re-education:  [x]  See flow sheet : ankle alphabets, marbles, doming/splaying    Rationale: improve coordination, improve balance and increase proprioception  to improve the patients ability to tolerate changing directions             With   [x] TE   [x] TA   [x] neuro   [] other: Patient Education: [x] Review HEP    [] Progressed/Changed HEP based on:   [x] positioning   [x] body mechanics   [] transfers   [] heat/ice application    [] other:      Other Objective/Functional Measures: Reassess weekly goals      Pain Level (0-10 scale) post treatment: 5/10    ASSESSMENT/Changes in Function:     Pt demonstrated decreased motor control with marble neuro muscular re-education and multiple attempts to  single marble. Pt required moderate verbal cuing in order to maximize motor recruitment/control with ankle alphabets. Patient will continue to benefit from skilled PT services to modify and progress therapeutic interventions, address functional mobility deficits, address ROM deficits, address strength deficits, analyze and address soft tissue restrictions, analyze and cue movement patterns, analyze and modify body mechanics/ergonomics, assess and modify postural abnormalities, address imbalance/dizziness and instruct in home and community integration to attain remaining goals.      []  See Plan of Care  []  See progress note/recertification  []  See Discharge Summary         Progress towards goals / Updated goals:  Short Term Goals: To be accomplished in 2 weeks:  1. Pt will report compliance with HEP in order to increase functional tolerance to prolonged/reptitive ADL's               Eval: issued              Reports compliance  2. Pt will report <5/10 pain on average on the NPRS in order to tolerate prolonged standing               Eval: 9/10   Reports 8/10  3. Pt will achieve bilateral ankle dorsiflexion of 10 degrees in order to tolerate prolonged walking              Eval: right: 0 degrees, left: 2 degrees   Reassess at PN  Long Term Goals: To be accomplished in 4 weeks:  1. Pt will score 66 on FOTO in order to return to PLOF               Eval: 58              To be reassessed at end of POC  2. Pt will score 5/5 on bilateral hip flexion MMT in order to tolerate repetitive/prolonged transfers               Eval: 4-/5 bilaterally    Scored 4+/5 on right and on 5/5 left  3. Pt will report <1/10 pain on average on the NPRS in order to tolerate prolonged walking                Eval: 9/10   Reports 8/10  4.  Pt will report ability to tolerate 1 hour standing with minimal discomfort in order to tolerate working duties               Eval: Reports being able to tolerate 30 minute standing     Reports tolerating only 30 minute standing        PLAN  [x]  Upgrade activities as tolerated     [x]  Continue plan of care  []  Update interventions per flow sheet       []  Discharge due to:_  []  Other:_      Arie Wong, PT 7/5/2022  5:16 PM    Future Appointments   Date Time Provider Billie Mackey   7/13/2022  5:15 PM Adrian Lau PT [facility] [address]   7/19/2022  5:15 PM Pebbles Borja PT [facility] [address]   7/25/2022  5:15 PM Pebbles Borja PT [facility] [address]   8/9/2022  9:15 AM Mary Christensen MD [facility] BS AMB

## 2022-07-13 ENCOUNTER — HOSPITAL ENCOUNTER (OUTPATIENT)
Dept: PHYSICAL THERAPY | Age: 52
Discharge: HOME | End: 2022-07-13
Payer: COMMERCIAL

## 2022-07-13 PROCEDURE — 97112 NEUROMUSCULAR REEDUCATION: CPT

## 2022-07-13 PROCEDURE — 97110 THERAPEUTIC EXERCISES: CPT

## 2022-07-13 PROCEDURE — 97530 THERAPEUTIC ACTIVITIES: CPT

## 2022-07-13 NOTE — PROGRESS NOTES
PT DAILY TREATMENT NOTE     Patient Name: Aviva Martinez  Date:2022  : 1970  [x]  Patient  Verified  Payor: Alan Owen / Plan: [place] [employer] / Product Type: PPO /    In time:511  Out time:608  Total Treatment Time (min): 62  Visit #: 4 of 4    Treatment Area: Left foot pain [M79.672]  Right foot pain [M79.671]    SUBJECTIVE  Pain Level (0-10 scale): 5  Any medication changes, allergies to medications, adverse drug reactions, diagnosis change, or new procedure performed?: [x] No    [] Yes (see summary sheet for update)  Subjective functional status/changes:   [] No changes reported  See PN     OBJECTIVE    Modality rationale: decrease pain to improve the patients ability to perform ADLs after therapy    Min Type Additional Details    [] Estim:  []Unatt       []IFC  []Premod                        []Other:  []w/ice   []w/heat  Position:  Location:    [] Estim: []Att    []TENS instruct  []NMES                    []Other:  []w/US   []w/ice   []w/heat  Position:  Location:    []  Traction: [] Cervical       []Lumbar                       [] Prone          []Supine                       []Intermittent   []Continuous Lbs:  [] before manual  [] after manual    []  Ultrasound: []Continuous   [] Pulsed                           []1MHz   []3MHz W/cm2:  Location:    []  Iontophoresis with dexamethasone         Location: [] Take home patch   [] In clinic   8 [x]  Ice     []  heat  []  Ice massage  []  Laser   []  Anodyne Position: supine  Location: bilateral heels/feet    []  Laser with stim  []  Other:  Position:  Location:    []  Vasopneumatic Device    []  Right     []  Left  Pre-treatment girth:  Post-treatment girth:  Measured at (location):  Pressure:       [] lo [] med [] hi   Temperature: [] lo [] med [] hi   [] Skin assessment post-treatment:  [x]intact []redness- no adverse reaction    []redness - adverse reaction:     10 min Therapeutic Exercise:  [x] See flow sheet :   Rationale: increase ROM and increase strength to improve the patients ability to perform ADLs     14 min Therapeutic Activity:  [x]  See flow sheet : reassessment, FOTO, pt edu on PT diagnosis, prognosis, POC   Rationale: education   to improve the patients ability to understand roles and goals of PT     23 min Neuromuscular Re-education:  [x]  See flow sheet : right fat pad atrophy, intrinsic foot strength    Rationale: increase strength, improve coordination and increase proprioception  to improve the patients ability to normalize gait          With   [] TE   [] TA   [] neuro   [] other: Patient Education: [x] Review HEP    [] Progressed/Changed HEP based on:   [] positioning   [] body mechanics   [] transfers   [] heat/ice application    [] other:      Other Objective/Functional Measures: see pN      Pain Level (0-10 scale) post treatment: 5    ASSESSMENT/Changes in Function: see PN    Patient will continue to benefit from skilled PT services to modify and progress therapeutic interventions, address functional mobility deficits, address ROM deficits, address strength deficits, analyze and address soft tissue restrictions, analyze and cue movement patterns, analyze and modify body mechanics/ergonomics, assess and modify postural abnormalities, address imbalance/dizziness and instruct in home and community integration to attain remaining goals. []  See Plan of Care  []  See progress note/recertification  []  See Discharge Summary         Progress towards goals / Updated goals:  1. Pt will report 3/10 pain on average on in order to tolerate prolonged standing              PN status: Progressing = 6/10  2. Pt will achieve bilateral ankle dorsiflexion of 10 degrees in order to tolerate prolonged walking              PN status: Progressing: right = 4degrees, left = 6degrees  3. Pt will score 66 on FOTO in order to return to PLOF              PN status: Progressing = 49  4.  Pt will report ability to tolerate 1 hour standing with minimal discomfort in order to tolerate working duties               PN status: Regression: decreased to 5minutes in order to manage symptoms    PLAN  [x]  Upgrade activities as tolerated     [x]  Continue plan of care  []  Update interventions per flow sheet       []  Discharge due to:_  []  Other:_      Felicity Chavez, PT 7/13/2022  6:04 PM    Future Appointments   Date Time Provider Billie Mackey   7/19/2022  5:15 PM Margarita Gomez, PT [facility]   7/25/2022  5:15 PM [facility]   8/2/2022  9:00 AM Yousif Christensen MD [facility]

## 2022-07-13 NOTE — PROGRESS NOTES
[facility] - [place] [address]  [address], [address] [phone] fax    Progress Note  Patient name: Isabel Garcia Start of Care: 2022   Referral source: Antolin Navarrete, [address] : 1970                Medical Diagnosis: Left foot pain [M79.672]  Right foot pain [M79.671]  Payor: Carlos A Vaughn / Plan: [place] [employer] PPO / Product Type: PPO /  Onset Date: 2022                Treatment Diagnosis: bilateral foot pain (left >right)   Prior Hospitalization: see medical history Provider#: [id number]   Medications: Verified on Patient summary List    Comorbidities: Arthritis   Prior Level of Function: Independent with all ADL's, able to tolerate prolonged standing/work duties.     Visits from Start of Care: 4    Missed Visits: 0    Established Goals:          Short Term Goals: To be accomplished in 2 weeks:  1. Pt will report compliance with HEP in order to increase functional tolerance to prolonged/reptitive ADL's               Eval: issued              Reports compliance  2. Pt will report <5/10 pain on average on the NPRS in order to tolerate prolonged standing               Eval: 9/10              Progressing = 610  3. Pt will achieve bilateral ankle dorsiflexion of 10 degrees in order to tolerate prolonged walking              Eval: right: 0 degrees, left: 2 degrees              Progressing: right = 4degrees, left = 6degrees    Long Term Goals: To be accomplished in 4 weeks:  1. Pt will score 66 on FOTO in order to return to PLOF               Eval: 36              Progressing = 49  2. Pt will score 5/5 on bilateral hip flexion MMT in order to tolerate repetitive/prolonged transfers               Eval: 4-/5 bilaterally               Met = 5/5  3. Pt will report <1/10 pain on average on the NPRS in order to tolerate prolonged walking                Eval: 9/10              progressing = 610  4.  Pt will report ability to tolerate 1 hour standing with minimal discomfort in order to tolerate working duties               Eval: Reports being able to tolerate 30 minute standing                Regression: decreased to 5minutes in order to manage symptoms    Key Functional Changes:   Functional Gains: decreased pain intermittently, decreased first step pain  Functional Deficits: daily pain, moderate levels, prolonged standing, weight bearing on the heel, walking  % improvement: 50%  Pain   Average: 6/10       Best: 5/10     Worst: 8/10  Patient Goal: \"to be able to power walk again, stand for at least 30minutes\"    Updated Goals: to be achieved in 4 weeks:  1. Pt will report 3/10 pain on average on in order to tolerate prolonged standing              PN status: Progressing = 6/10  2. Pt will achieve bilateral ankle dorsiflexion of 10 degrees in order to tolerate prolonged walking              PN status: Progressing: right = 4degrees, left = 6degrees  3. Pt will score 66 on FOTO in order to return to PLOF              PN status: Progressing = 49  4. Pt will report ability to tolerate 1 hour standing with minimal discomfort in order to tolerate working [employer]              PN status: Regression: decreased to 5minutes in order to manage symptoms    ASSESSMENT/RECOMMENDATIONS: Ms. Wenda Alpers reports 50% improvement since beginning therapy. Pt demonstrates improved hip strength and ankle AROM. She has regressed in the amount of time she tolerates in standing due to self modification of activity to limit pain while at work. Pt may also have some fat pad atrophy contributing to her central and lateral heel pain; pt reported improvement with heel pad taping during treatment today.  Pt will benefit from continued skilled PT in order to address remaining deficits and maximize functional potential.    [x]Continue therapy per initial plan/protocol at a frequency of  1 x per week for 4 weeks  []Continue therapy with the following recommended changes:_____________________ _____________________________________________________________________  []Discontinue therapy progressing towards or have reached established goals  []Discontinue therapy due to lack of appreciable progress towards goals  []Discontinue therapy due to lack of attendance or compliance  []Await Physician's recommendations/decisions regarding therapy  []Other:________________________________________________________________    Thank you for this referral.    Fidelina Sidhu, PT 7/13/2022 5:16 PM  NOTE TO PHYSICIAN:  [address] AND   FAX TO [facility]: ([phone]  If you are unable to process this request in 24 hours please contact our office: [phone]    []  I have read the above report and request that my patient continue as recommended. []  I have read the above report and request that my patient continue therapy with the following changes/special instructions:________________________________________  [] I have read the above report and request that my patient be discharged from therapy.     Physician's Signature:____________Date:_________TIME:________     NAZARIO Flynn  ** Signature, Date and Time must be completed for valid certification **

## 2022-07-18 ENCOUNTER — TELEPHONE (OUTPATIENT)
Dept: PHYSICAL THERAPY | Age: 52
End: 2022-07-18

## 2022-07-19 ENCOUNTER — APPOINTMENT (OUTPATIENT)
Dept: PHYSICAL THERAPY | Age: 52
End: 2022-07-19
Payer: COMMERCIAL

## 2022-07-22 ENCOUNTER — TELEPHONE (OUTPATIENT)
Dept: PHYSICAL THERAPY | Age: 52
End: 2022-07-22

## 2022-07-25 ENCOUNTER — APPOINTMENT (OUTPATIENT)
Dept: PHYSICAL THERAPY | Age: 52
End: 2022-07-25
Payer: COMMERCIAL

## 2022-08-08 ENCOUNTER — TELEPHONE (OUTPATIENT)
Dept: PHYSICAL THERAPY | Age: 52
End: 2022-08-08

## 2022-08-23 NOTE — PROGRESS NOTES
[facility] - [place] [address]  [address], [phone] fax    Discharge Summary  Patient name: Nuria Chavez Start of Care: 2022   Referral source: Jackeline Rodriguez : 1970                Medical Diagnosis: Left foot pain [M79.672]  Right foot pain [M79.671]  Payor: Lidia Norman / Plan: [place] [employer] PPO / Product Type: PPO /  Onset Date: 2022                Treatment Diagnosis: bilateral foot pain (left >right)   Prior Hospitalization: see medical history Provider#: [id number]   Medications: Verified on Patient summary List    Comorbidities: Arthritis   Prior Level of Function: Independent with all ADL's, able to tolerate prolonged standing/work duties. Visits from Start of Care: 4    Missed Visits: 0  Reporting Period : 2022 to 2022    Goals:  1. Pt will report 3/10 pain on average on in order to tolerate prolonged standing              PN status: Progressing = 6/10   DC: Due to unexpected DC, pt was unable to be reassessed   2. Pt will achieve bilateral ankle dorsiflexion of 10 degrees in order to tolerate prolonged walking              PN status: Progressing: right = 4degrees, left = 6degrees   DC: Due to unexpected DC, pt was unable to be reassessed   3. Pt will score 66 on FOTO in order to return to PLOF              PN status: Progressing = 49   DC: Due to unexpected DC, pt was unable to be reassessed   4. Pt will report ability to tolerate 1 hour standing with minimal discomfort in order to tolerate working duties               PN status: Regression: decreased to 5minutes in order to manage symptoms   DC: Due to unexpected DC, pt was unable to be reassessed     Assessment/Summary of care:   Pt was seen for 4 sessions. Per pt's chart, the pt stated she did not want to continue therapy. Due to unexpected discharge, unable to reassess goals above.      RECOMMENDATIONS:  [x]Discontinue therapy: []Patient has reached or is progressing toward set goals      [x]Patient is non-compliant or has abdicated      []Due to lack of appreciable progress towards set goals    Romero Lucas, PT 8/23/2022 3:32 PM

## 2023-01-31 ENCOUNTER — TRANSCRIBE ORDERS (OUTPATIENT)
Facility: HOSPITAL | Age: 53
End: 2023-01-31

## 2023-01-31 ENCOUNTER — OFFICE VISIT (OUTPATIENT)
Dept: ORTHOPEDIC SURGERY | Age: 53
End: 2023-01-31
Payer: COMMERCIAL

## 2023-01-31 ENCOUNTER — TRANSCRIBE ORDER (OUTPATIENT)
Dept: SCHEDULING | Age: 53
End: 2023-01-31

## 2023-01-31 VITALS — HEIGHT: 70 IN | BODY MASS INDEX: 40.15 KG/M2 | RESPIRATION RATE: 18 BRPM

## 2023-01-31 DIAGNOSIS — M25.561 CHRONIC PAIN OF RIGHT KNEE: ICD-10-CM

## 2023-01-31 DIAGNOSIS — G89.29 CHRONIC PAIN OF LEFT KNEE: ICD-10-CM

## 2023-01-31 DIAGNOSIS — G89.29 CHRONIC PAIN OF RIGHT KNEE: ICD-10-CM

## 2023-01-31 DIAGNOSIS — Z12.31 VISIT FOR SCREENING MAMMOGRAM: Primary | ICD-10-CM

## 2023-01-31 DIAGNOSIS — M25.562 CHRONIC PAIN OF LEFT KNEE: ICD-10-CM

## 2023-01-31 DIAGNOSIS — M17.0 BILATERAL PRIMARY OSTEOARTHRITIS OF KNEE: Primary | ICD-10-CM

## 2023-01-31 RX ORDER — TRIAMCINOLONE ACETONIDE 40 MG/ML
40 INJECTION, SUSPENSION INTRA-ARTICULAR; INTRAMUSCULAR ONCE
Status: COMPLETED | OUTPATIENT
Start: 2023-01-31 | End: 2023-01-31

## 2023-01-31 RX ORDER — DICLOFENAC SODIUM 75 MG/1
75 TABLET, DELAYED RELEASE ORAL 2 TIMES DAILY WITH MEALS
Qty: 120 TABLET | Refills: 2 | Status: SHIPPED | OUTPATIENT
Start: 2023-01-31

## 2023-01-31 RX ADMIN — TRIAMCINOLONE ACETONIDE 40 MG: 40 INJECTION, SUSPENSION INTRA-ARTICULAR; INTRAMUSCULAR at 16:33

## 2023-01-31 NOTE — PROGRESS NOTES
Patient: Kira Dupont                MRN: [id number]       SSN: [id number]  YOB: 1970        AGE: 47 y.o. SEX: female  Body mass index is 40.15 kg/m². PCP: NAZARIO Lockhart  01/31/23    Chief Complaint: Bilateral knee pain    HPI: Kira Dupont is a 47 y.o. female patient who returns to the office today for bilateral knee pain. She reports the pain is worse when getting up from a chair or going up and down stairs. She reports pain in both knees. She rates the pain as 7 out of 10 on the pain scale today. She denies any recent injury or trauma. In the past she has been treated with injections. She most recently had an injection in November by her primary care office which she said did not give her much in the way of pain relief. She believes this was a steroid injection. She has also had oral anti-inflammatories and hyaluronic acid injections into her knees previously. History reviewed. No pertinent past medical history. Family History   Problem Relation Age of Onset    Cancer Maternal Aunt     Cancer Father        Current Outpatient Medications   Medication Sig Dispense Refill    diclofenac EC (VOLTAREN) 75 mg EC tablet Take 1 Tablet by mouth two (2) times daily (with meals). 120 Tablet 2    diclofenac EC (VOLTAREN) 75 mg EC tablet Take 1 Tablet by mouth two (2) times daily (with meals). 60 Tablet 0    diclofenac (VOLTAREN) 1 % gel Apply  to affected area four (4) times daily. acetaminophen (TYLENOL) 500 mg tablet Take  by mouth every six (6) hours as needed for Pain. No Known Allergies    History reviewed. No pertinent surgical history.     Social History     Socioeconomic History    Marital status:      Spouse name: Not on file    Number of children: Not on file    Years of education: Not on file    Highest education level: Not on file   Occupational History    Not on file   Tobacco Use    Smoking status: Never    Smokeless tobacco: Never   Substance and Sexual Activity    Alcohol use: Not on file    Drug use: Not on file    Sexual activity: Not on file   Other Topics Concern    Not on file   Social History Narrative    Not on file     Social Determinants of Health     Financial Resource Strain: Not on file   Food Insecurity: Not on file   Transportation Needs: Not on file   Physical Activity: Not on file   Stress: Not on file   Social Connections: Not on file   Intimate Partner Violence: Not on file   Housing Stability: Not on file       REVIEW OF SYSTEMS:      No changes from previous review of systems unless noted. PHYSICAL EXAMINATION:  Visit Vitals  Resp 18   Ht 5' 10\" (1.778 m)   BMI 40.15 kg/m²     Body mass index is 40.15 kg/m². GENERAL: Alert and oriented x3, in no acute distress. HEENT: Normocephalic, atraumatic. Knee Examination      R   L  Effusion   -   -  Warmth   -   -  Erythema   -   -  ROM   Extension  Full   Full   Flexion   Full   Full  Tenderness   Medial Joint  +   +   Lateral Joint  -   -   Posterior knee  -   -  Strength   Quad   5   5   Hamstring  5   5  Crepitus   Tibiofemoral   -   -   Patellofemoral  -   -  Instability   Anterior  -   -   Posterior  -   -   Patellofemoral  -   -  Lachman's   -   -  Anterior Drawer  -   -  Posterior Drawer  -   -  Shaheen's   Pain   -   -   Locking  -   -  Calf TTP   -   -  Straight Leg Raise  -   -      IMAGING:  Imaging read by myself and interpreted as follows:  X-rays 3 views of both knees were taken in the office today. These show moderate knee osteoarthritis in both knees. Medial joint space narrowing. Osteophytes present in the medial and lateral joints space of both knees    ASSESSMENT & PLAN  Diagnosis: Bilateral knee osteoarthritis    Chapin Cazares continues to have symptomatic bilateral knee osteoarthritis. We discussed the treatment options again today at length.   I recommended she consider seeing one of our total joint replacement surgeons to discuss the possibility of a knee replacement for either knee as her pain continues to be unable to be fully controlled with oral medications. She does not feel like she is ready for total knee replacement just yet. She would like to try corticosteroid injections into both knees which were given. We also discussed and I increased her oral anti-inflammatory medication prescription today in the office. She can continue with activity modification as well as activity as tolerated. I encouraged her to do low impact exercises to limit the stress on the knees. Follow-up as needed with one of our joint replacement surgeons. Prescription medication management discussed with patient. [facility]  OFFICE PROCEDURE PROGRESS NOTE        Chart reviewed for the following:   Hernán Newton MD, have reviewed the History, Physical and updated the Allergic reactions for [address] performed immediately prior to start of procedure:   Hernán Newton MD, have performed the following reviews on [facility] prior to the start of the procedure:            * Patient was identified by name and date of birth   * Agreement on procedure being performed was verified  * Risks and Benefits explained to the patient  * Procedure site verified and marked as necessary  * Patient was positioned for comfort  * Consent was signed and verified    Time: 4:41 PM    Location: Bilateral knee intra-articular injections    Kenalog 40mg & 3cc Lidocaine    Date of procedure: 1/31/2023    Procedure performed by:  Dot Taylor MD    Provider assisted by: In Office MA    Patient assisted by: self    How tolerated by patient: tolerated the procedure well with no complications    Post Procedural Pain Scale: 0 - No Hurt    Comments: none      Electronically signed by: Dot Taylor MD    Note: This note was completed using voice recognition software.   Any typographical/name errors or mistakes are unintentional.

## 2023-02-05 DIAGNOSIS — Z12.31 VISIT FOR SCREENING MAMMOGRAM: Primary | ICD-10-CM

## 2023-02-23 ENCOUNTER — HOSPITAL ENCOUNTER (OUTPATIENT)
Facility: HOSPITAL | Age: 53
Discharge: HOME | End: 2023-02-23
Payer: COMMERCIAL

## 2023-02-23 DIAGNOSIS — Z12.31 VISIT FOR SCREENING MAMMOGRAM: ICD-10-CM

## 2023-02-23 PROCEDURE — 77067 SCR MAMMO BI INCL CAD: CPT

## 2023-04-04 ENCOUNTER — TELEPHONE (OUTPATIENT)
Age: 53
End: 2023-04-04

## 2023-04-04 ENCOUNTER — OFFICE VISIT (OUTPATIENT)
Age: 53
End: 2023-04-04
Payer: COMMERCIAL

## 2023-04-04 VITALS — HEIGHT: 70 IN | WEIGHT: 279 LBS | BODY MASS INDEX: 39.94 KG/M2

## 2023-04-04 DIAGNOSIS — M17.11 PRIMARY OSTEOARTHRITIS OF RIGHT KNEE: Primary | ICD-10-CM

## 2023-04-04 DIAGNOSIS — M11.261 CHONDROCALCINOSIS OF RIGHT KNEE: ICD-10-CM

## 2023-04-04 DIAGNOSIS — R60.0 LEG EDEMA, RIGHT: ICD-10-CM

## 2023-04-04 DIAGNOSIS — M25.561 RIGHT KNEE PAIN, UNSPECIFIED CHRONICITY: Primary | ICD-10-CM

## 2023-04-04 DIAGNOSIS — M17.11 PRIMARY OSTEOARTHRITIS OF RIGHT KNEE: ICD-10-CM

## 2023-04-04 PROCEDURE — 99214 OFFICE O/P EST MOD 30 MIN: CPT | Performed by: PHYSICIAN ASSISTANT

## 2023-04-04 PROCEDURE — 73560 X-RAY EXAM OF KNEE 1 OR 2: CPT | Performed by: PHYSICIAN ASSISTANT

## 2023-04-04 RX ORDER — HYDROCODONE BITARTRATE AND ACETAMINOPHEN 5; 325 MG/1; MG/1
1 TABLET ORAL EVERY 6 HOURS PRN
Qty: 28 TABLET | Refills: 0 | Status: SHIPPED | OUTPATIENT
Start: 2023-04-04 | End: 2023-04-11

## 2023-04-04 NOTE — PROGRESS NOTES
synovitis secondary to injection received on Friday versus pseudogout flare versus DVT versus degenerative arthritis acute exacerbation. Given her significant edema today and her history of pulmonary embolus we will order a stat Doppler of bilateral legs to rule out any DVT. Discussed I do not feel comfortable prescribing any medications such as prednisone or giving a cortisone injection to treat her inflammation as I do not know exactly what was injected into her knee on Friday. She will continue with protected weightbearing and follow-up with [facility]. We will give her a one-time prescription for Norco 5 to help with her extreme pain  Risk factors: BMI>40, h/o PE  2. No cortisone injection indicated today  3. No Physical/Occupational Therapy indicated today  4. Yes diagnostic test indicated today bilateral lower extremity   5. Yes durable medical equipment indicated today  6. No referral indicated today   7. No medications indicated today:   8. Yes Narcotic indicated today for short term acute pain secondary to acute pain. Patient given pain medication for short term acute pain relief. Goal is to treat patient according to above plan and to ultimately have patient off all pain medications once appropriate. If chronic pain management is required beyond what is expected for current orthopedic problem, will refer patient to pain management.   was reviewed and will be reviewed with every medication refill request.     RTC PRN     JOSEE Brown [facility]

## 2023-04-05 ENCOUNTER — TELEPHONE (OUTPATIENT)
Age: 53
End: 2023-04-05

## 2023-04-05 RX ORDER — HYDROCODONE BITARTRATE AND ACETAMINOPHEN 5; 325 MG/1; MG/1
1 TABLET ORAL EVERY 6 HOURS PRN
Qty: 28 TABLET | Refills: 0 | Status: SHIPPED | OUTPATIENT
Start: 2023-04-05 | End: 2023-04-12

## 2023-04-05 NOTE — LETTER
April 7, 2023       Roni Mccormick YOB: 1970   [facility] [address] Date of Visit:  4/5/2023       To Whom It May Concern: It is my medical opinion that Tiffany Brasher {Work release (duty restriction):22343}. If you have any questions or concerns, please don't hesitate to call.     Sincerely,        YOVANY Healy

## 2023-04-05 NOTE — TELEPHONE ENCOUNTER
Patient called in and is requesting for a work note for her appointment yesterday.          Please call and advise patient of when that document is ready       [phone]

## 2023-04-21 ENCOUNTER — HOSPITAL ENCOUNTER (OUTPATIENT)
Facility: HOSPITAL | Age: 53
Discharge: HOME | End: 2023-04-24

## 2023-04-21 ENCOUNTER — OFFICE VISIT (OUTPATIENT)
Age: 53
End: 2023-04-21

## 2023-04-21 VITALS — BODY MASS INDEX: 41.23 KG/M2 | WEIGHT: 288 LBS | HEIGHT: 70 IN

## 2023-04-21 DIAGNOSIS — M25.561 CHRONIC PAIN OF RIGHT KNEE: ICD-10-CM

## 2023-04-21 DIAGNOSIS — Q68.2 PATELLA ALTA: ICD-10-CM

## 2023-04-21 DIAGNOSIS — M17.12 PATELLOFEMORAL ARTHRITIS OF LEFT KNEE: ICD-10-CM

## 2023-04-21 DIAGNOSIS — M17.11 PRIMARY OSTEOARTHRITIS OF RIGHT KNEE: Primary | ICD-10-CM

## 2023-04-21 DIAGNOSIS — M17.11 PATELLOFEMORAL ARTHRITIS OF RIGHT KNEE: ICD-10-CM

## 2023-04-21 DIAGNOSIS — G89.29 CHRONIC PAIN OF RIGHT KNEE: ICD-10-CM

## 2023-04-21 DIAGNOSIS — M17.11 PRIMARY OSTEOARTHRITIS OF RIGHT KNEE: ICD-10-CM

## 2023-04-21 DIAGNOSIS — M25.562 CHRONIC PAIN OF LEFT KNEE: ICD-10-CM

## 2023-04-21 DIAGNOSIS — M17.12 PRIMARY OSTEOARTHRITIS OF LEFT KNEE: ICD-10-CM

## 2023-04-21 DIAGNOSIS — G89.29 CHRONIC PAIN OF LEFT KNEE: ICD-10-CM

## 2023-04-21 LAB — SENTARA SPECIMEN COLLECTION: NORMAL

## 2023-04-21 PROCEDURE — 99001 SPECIMEN HANDLING PT-LAB: CPT

## 2023-04-21 RX ORDER — BETAMETHASONE SODIUM PHOSPHATE AND BETAMETHASONE ACETATE 3; 3 MG/ML; MG/ML
6 INJECTION, SUSPENSION INTRA-ARTICULAR; INTRALESIONAL; INTRAMUSCULAR; SOFT TISSUE ONCE
Status: COMPLETED | OUTPATIENT
Start: 2023-04-21 | End: 2023-04-21

## 2023-04-21 RX ORDER — MELOXICAM 15 MG/1
15 TABLET ORAL DAILY PRN
Qty: 30 TABLET | Refills: 3 | Status: SHIPPED | OUTPATIENT
Start: 2023-04-21

## 2023-04-21 RX ADMIN — BETAMETHASONE SODIUM PHOSPHATE AND BETAMETHASONE ACETATE 6 MG: 3; 3 INJECTION, SUSPENSION INTRA-ARTICULAR; INTRALESIONAL; INTRAMUSCULAR; SOFT TISSUE at 15:48

## 2023-04-21 RX ADMIN — BETAMETHASONE SODIUM PHOSPHATE AND BETAMETHASONE ACETATE 6 MG: 3; 3 INJECTION, SUSPENSION INTRA-ARTICULAR; INTRALESIONAL; INTRAMUSCULAR; SOFT TISSUE at 15:49

## 2023-04-21 NOTE — PROGRESS NOTES
GEL Apply topically 4 times daily      diclofenac (VOLTAREN) 75 MG EC tablet Take 1 tablet by mouth 2 times daily (with meals)       No current facility-administered medications for this visit. No Known Allergies    No past surgical history on file. Social History     Socioeconomic History    Marital status:      Spouse name: Not on file    Number of children: Not on file    Years of education: Not on file    Highest education level: Not on file   Occupational History    Not on file   Tobacco Use    Smoking status: Never    Smokeless tobacco: Never   Substance and Sexual Activity    Alcohol use: Not on file    Drug use: Not on file    Sexual activity: Not on file   Other Topics Concern    Not on file   Social History Narrative    Not on file     Social Determinants of Health     Financial Resource Strain: Not on file   Food Insecurity: Not on file   Transportation Needs: Not on file   Physical Activity: Not on file   Stress: Not on file   Social Connections: Not on file   Intimate Partner Violence: Not on file   Housing Stability: Not on file       There were no vitals taken for this visit. PHYSICAL EXAMINATION:  GENERAL: Alert and oriented x3, in no acute distress, well-developed, well-nourished, afebrile. HEART: No JVD. EYES: No scleral icterus   NECK: No significant lymphadenopathy   LUNGS: No respiratory compromise or indrawing  ABDOMEN: Soft, non-tender, non-distended. Note: This note was completed using voice recognition software.  Any typographical/name errors or mistakes are unintentional.    Electronically signed by: Kamille Loyd MA

## 2023-04-24 LAB
ANTI-DNA (DS) AB QN: 1 IU/ML
BASOPHILS ABSOLUTE: 0 K/UL (ref 0–0.2)
BASOPHILS RELATIVE PERCENT: 1 % (ref 0–2)
C-REACTIVE PROTEIN: <0.5 MG/DL
CENTROMERE B AB: <0.2 AI
CHROMATIN ANTIBODY: <0.2 AI
EOSINOPHIL # BLD: 9 % (ref 0–6)
EOSINOPHILS ABSOLUTE: 0.4 K/UL (ref 0–0.5)
ERYTHROCYTE SEDIMENTATION RATE: 44 MM/HR
HCT VFR BLD CALC: 40.1 % (ref 35.1–48)
HEMOGLOBIN: 12.4 G/DL (ref 11.7–16)
JO-1 ANTIBODY: <0.2 AI
LYMPHOCYTES # BLD: 48 % (ref 20–45)
LYMPHOCYTES ABSOLUTE: 2 K/UL (ref 1–4.8)
MCH RBC QN AUTO: 28 PG (ref 26–34)
MCHC RBC AUTO-ENTMCNC: 31 G/DL (ref 31–36)
MCV RBC AUTO: 90 FL (ref 80–99)
MONOCYTES ABSOLUTE: 0.3 K/UL (ref 0.1–1)
MONOCYTES: 6 % (ref 3–12)
NEUTROPHILS ABSOLUTE: 1.5 K/UL (ref 1.8–7.7)
NEUTROPHILS: 36 % (ref 40–75)
PDW BLD-RTO: 14.6 % (ref 10–15.5)
PLATELET # BLD: 247 K/UL (ref 140–440)
PMV BLD AUTO: 13 FL (ref 9–13)
RBC: 4.45 M/UL (ref 3.8–5.2)
RHEUMATOID FACTOR QUANT, IMMUNOTURBIDIMETRIC: <20 IU/ML (ref 0–20)
RNP ANTIBODY: <0.2 AI
SCLERODERMA (SCL-70) AB: 1.2 AI
SJOGREN'S ANTI-SS-A: 5.1 AI
SJOGREN'S ANTI-SS-B: <0.2 AI
SMITH ABS: <0.2 AI
TOTAL CK: 132 U/L (ref 30–165)
URIC ACID: 3.6 MG/DL (ref 2.2–7.7)
WBC: 4.1 K/UL (ref 4–11)

## 2023-04-25 ENCOUNTER — TELEPHONE (OUTPATIENT)
Age: 53
End: 2023-04-25

## 2023-04-25 DIAGNOSIS — M17.12 PRIMARY OSTEOARTHRITIS OF LEFT KNEE: ICD-10-CM

## 2023-04-25 DIAGNOSIS — M17.11 PRIMARY OSTEOARTHRITIS OF RIGHT KNEE: ICD-10-CM

## 2023-04-25 DIAGNOSIS — R89.9 ABNORMAL LABORATORY TEST RESULT: Primary | ICD-10-CM

## 2023-04-25 DIAGNOSIS — M17.11 PRIMARY OSTEOARTHRITIS OF RIGHT KNEE: Primary | ICD-10-CM

## 2023-04-25 NOTE — TELEPHONE ENCOUNTER
Patient would like to proceed with TKA that was discussed on 4/21/23. She would like to do the Rt Knee first.  Please put in order, equipment and rep needed. Dora Lay will set up the surgery.

## 2023-04-27 ENCOUNTER — APPOINTMENT (OUTPATIENT)
Facility: HOSPITAL | Age: 53
End: 2023-04-27
Payer: COMMERCIAL

## 2023-04-27 ENCOUNTER — HOSPITAL ENCOUNTER (EMERGENCY)
Facility: HOSPITAL | Age: 53
Discharge: HOME | End: 2023-04-28
Attending: EMERGENCY MEDICINE
Payer: COMMERCIAL

## 2023-04-27 DIAGNOSIS — R52 TINGLING PAIN: Primary | ICD-10-CM

## 2023-04-27 LAB
ALBUMIN SERPL-MCNC: 3.4 G/DL (ref 3.4–5)
ALBUMIN/GLOB SERPL: 0.9 (ref 0.8–1.7)
ALP SERPL-CCNC: 105 U/L (ref 45–117)
ALT SERPL-CCNC: 31 U/L (ref 13–56)
ANION GAP SERPL CALC-SCNC: 3 MMOL/L (ref 3–18)
AST SERPL-CCNC: 19 U/L (ref 10–38)
BASOPHILS # BLD: 0 K/UL (ref 0–0.1)
BASOPHILS NFR BLD: 1 % (ref 0–2)
BILIRUB SERPL-MCNC: 0.4 MG/DL (ref 0.2–1)
BUN SERPL-MCNC: 15 MG/DL (ref 7–18)
BUN/CREAT SERPL: 19 (ref 12–20)
CALCIUM SERPL-MCNC: 9.1 MG/DL (ref 8.5–10.1)
CHLORIDE SERPL-SCNC: 108 MMOL/L (ref 100–111)
CO2 SERPL-SCNC: 30 MMOL/L (ref 21–32)
CREAT SERPL-MCNC: 0.81 MG/DL (ref 0.6–1.3)
DIFFERENTIAL METHOD BLD: ABNORMAL
EOSINOPHIL # BLD: 0.4 K/UL (ref 0–0.4)
EOSINOPHIL NFR BLD: 7 % (ref 0–5)
ERYTHROCYTE [DISTWIDTH] IN BLOOD BY AUTOMATED COUNT: 14 % (ref 11.6–14.5)
GLOBULIN SER CALC-MCNC: 3.9 G/DL (ref 2–4)
GLUCOSE SERPL-MCNC: 82 MG/DL (ref 74–99)
HCT VFR BLD AUTO: 36.5 % (ref 35–45)
HGB BLD-MCNC: 11.4 G/DL (ref 12–16)
IMM GRANULOCYTES # BLD AUTO: 0 K/UL (ref 0–0.04)
IMM GRANULOCYTES NFR BLD AUTO: 0 % (ref 0–0.5)
LYMPHOCYTES # BLD: 2 K/UL (ref 0.9–3.6)
LYMPHOCYTES NFR BLD: 38 % (ref 21–52)
MCH RBC QN AUTO: 27.3 PG (ref 24–34)
MCHC RBC AUTO-ENTMCNC: 31.2 G/DL (ref 31–37)
MCV RBC AUTO: 87.5 FL (ref 78–100)
MONOCYTES # BLD: 0.3 K/UL (ref 0.05–1.2)
MONOCYTES NFR BLD: 5 % (ref 3–10)
NEUTS SEG # BLD: 2.7 K/UL (ref 1.8–8)
NEUTS SEG NFR BLD: 50 % (ref 40–73)
NRBC # BLD: 0 K/UL (ref 0–0.01)
NRBC BLD-RTO: 0 PER 100 WBC
PLATELET # BLD AUTO: 219 K/UL (ref 135–420)
PMV BLD AUTO: 11.8 FL (ref 9.2–11.8)
POTASSIUM SERPL-SCNC: 3.7 MMOL/L (ref 3.5–5.5)
PROT SERPL-MCNC: 7.3 G/DL (ref 6.4–8.2)
RBC # BLD AUTO: 4.17 M/UL (ref 4.2–5.3)
SODIUM SERPL-SCNC: 141 MMOL/L (ref 136–145)
WBC # BLD AUTO: 5.3 K/UL (ref 4.6–13.2)

## 2023-04-27 PROCEDURE — 73030 X-RAY EXAM OF SHOULDER: CPT

## 2023-04-27 PROCEDURE — 72040 X-RAY EXAM NECK SPINE 2-3 VW: CPT

## 2023-04-27 PROCEDURE — 99284 EMERGENCY DEPT VISIT MOD MDM: CPT

## 2023-04-27 PROCEDURE — 80053 COMPREHEN METABOLIC PANEL: CPT

## 2023-04-27 PROCEDURE — 71045 X-RAY EXAM CHEST 1 VIEW: CPT

## 2023-04-27 PROCEDURE — 85025 COMPLETE CBC W/AUTO DIFF WBC: CPT

## 2023-04-28 VITALS
OXYGEN SATURATION: 100 % | TEMPERATURE: 98.1 F | DIASTOLIC BLOOD PRESSURE: 88 MMHG | WEIGHT: 280 LBS | HEIGHT: 70 IN | SYSTOLIC BLOOD PRESSURE: 165 MMHG | BODY MASS INDEX: 40.09 KG/M2 | HEART RATE: 66 BPM | RESPIRATION RATE: 18 BRPM

## 2023-04-28 PROCEDURE — 6370000000 HC RX 637 (ALT 250 FOR IP)

## 2023-04-28 RX ORDER — LIDOCAINE 50 MG/G
1 PATCH TOPICAL DAILY
Qty: 10 PATCH | Refills: 0 | Status: SHIPPED | OUTPATIENT
Start: 2023-04-28 | End: 2023-05-08

## 2023-04-28 RX ORDER — PREDNISONE 50 MG/1
50 TABLET ORAL DAILY
Qty: 5 TABLET | Refills: 0 | Status: SHIPPED | OUTPATIENT
Start: 2023-04-28 | End: 2023-05-03

## 2023-04-28 RX ORDER — LIDOCAINE 4 G/G
1 PATCH TOPICAL ONCE
Status: DISCONTINUED | OUTPATIENT
Start: 2023-04-28 | End: 2023-04-28 | Stop reason: HOSPADM

## 2023-04-28 RX ORDER — METHOCARBAMOL 500 MG/1
500 TABLET, FILM COATED ORAL 3 TIMES DAILY
Qty: 15 TABLET | Refills: 0 | Status: SHIPPED | OUTPATIENT
Start: 2023-04-28 | End: 2023-05-03

## 2023-04-28 RX ADMIN — PREDNISONE 50 MG: 20 TABLET ORAL at 01:05

## 2023-04-28 ASSESSMENT — ENCOUNTER SYMPTOMS
NAUSEA: 0
CHEST TIGHTNESS: 0
CONSTIPATION: 0
COUGH: 0
ABDOMINAL PAIN: 0
DIARRHEA: 0
SHORTNESS OF BREATH: 0
VOMITING: 0
BACK PAIN: 0

## 2023-04-28 NOTE — DISCHARGE INSTRUCTIONS
Begin taking prednisone once a day for 5 days. Begin taking Robaxin up to 3 times a day as needed for muscle spasm. Place Lidoderm patch on affected area, 12 hours on and 12 hours off. Follow-up with Dr. Odette Clayton within the next few days in order to be reevaluated. Return to the ED with any new or worsening symptoms.

## 2023-04-28 NOTE — ED NOTES
Pt requesting PIV be removed as it is causing discomfort. PIV removed. Catheter remains intact. Pt pending final reads on xrays.         Wilton Vargas RN  04/28/23 [id number]

## 2023-04-28 NOTE — ED PROVIDER NOTES
EMERGENCY DEPARTMENT HISTORY AND PHYSICAL EXAM    10:58 PM      Date: 4/27/2023  Patient Name: Katerina Garza    History of Presenting Illness     Chief Complaint   Patient presents with    Numbness    Knee Pain         History Provided By: the patient. Additional History (Context): Katerina Garza is a 47 y.o. female with No past medical history on file. who presents with right shoulder pain. Patient reports that she is having pain in her right shoulder that feels like pins-and-needles. Patient states that this started yesterday. Patient states that she was concerned that she slept on the area wrong and is now having pain related to this. Reports that she has chronic knee pain and was recently on crutches due to this. Reports that she has been having increasing cramping in her legs over the last couple days, but is unsure of this is related to recently getting off crutches. PCP: PROVIDER UNKNOWN, BJ    No current facility-administered medications for this encounter. Current Outpatient Medications   Medication Sig Dispense Refill    meloxicam (MOBIC) 15 MG tablet Take 1 tablet by mouth daily as needed for Pain Take as needed, with food. Do not mix NSAIDs. 30 tablet 3    acetaminophen (TYLENOL) 500 MG tablet Take by mouth every 6 hours as needed      diclofenac sodium (VOLTAREN) 1 % GEL Apply topically 4 times daily      diclofenac (VOLTAREN) 75 MG EC tablet Take 1 tablet by mouth 2 times daily (with meals)         Past History     Past Medical History:  No past medical history on file. Past Surgical History:  No past surgical history on file.     Family History:  Family History   Problem Relation Age of Onset    Cancer Maternal Aunt     Cancer Father        Social History:  Social History     Tobacco Use    Smoking status: Never    Smokeless tobacco: Never       Allergies:  No Known Allergies      Review of Systems       Review of Systems   Constitutional:  Negative for

## 2023-04-28 NOTE — ED NOTES
Allergies verified. VS updated. Pt medicated with PO meds. Lidocaine patch applied to R shoulder. See REGGIE Zaldivar, HAKAN  04/28/23 0111

## 2023-04-28 NOTE — ED NOTES
1:55 AM  04/28/23    Pt discharged ambulatory. Discharge instructions given to pt (name) with verbalization of understanding. Patient accompanied by self. Patient discharged with the following prescriptions Lidocaine, Robaxin, and Prednisone. Patient discharged to home (destination).       HAKAN Flores RN  04/28/23 9977

## 2023-04-28 NOTE — ED TRIAGE NOTES
Pt in ED with c/o numbness to the right side. And bilateral knee pain. Provider in triage to assess for a stroke. No stroke alert called at this time.

## 2023-05-18 ENCOUNTER — TELEPHONE (OUTPATIENT)
Age: 53
End: 2023-05-18

## 2023-05-18 NOTE — TELEPHONE ENCOUNTER
Patient is advised to keep her appointment tomorrow to go over the labs that were ordered at her appointment in April.

## 2023-05-18 NOTE — TELEPHONE ENCOUNTER
Since her last appointment the patient has decided to schedule her surgery so she really doesn't know if she even needs this appointment or not.   Can someone please advise her if she still needs to come in or not on 5/19

## 2023-05-18 NOTE — TELEPHONE ENCOUNTER
Patient said she is scheduled for surgery on 11/15/23 and doesn't feel she needs to come in tomorrow. She ask me to confirm this with Teofilo. I sent an e-mail to teofilo to confirm the [address] date.

## 2023-06-21 ENCOUNTER — OFFICE VISIT (OUTPATIENT)
Age: 53
End: 2023-06-21

## 2023-06-21 VITALS — BODY MASS INDEX: 41.95 KG/M2 | HEIGHT: 70 IN | WEIGHT: 293 LBS

## 2023-06-21 DIAGNOSIS — M17.12 PATELLOFEMORAL ARTHRITIS OF LEFT KNEE: ICD-10-CM

## 2023-06-21 DIAGNOSIS — M13.162 INFLAMMATION OF JOINT OF LEFT KNEE: ICD-10-CM

## 2023-06-21 DIAGNOSIS — M13.161 INFLAMMATION OF JOINT OF RIGHT KNEE: ICD-10-CM

## 2023-06-21 DIAGNOSIS — Q68.2 PATELLA ALTA: ICD-10-CM

## 2023-06-21 DIAGNOSIS — M81.0 OSTEOPOROSIS, UNSPECIFIED OSTEOPOROSIS TYPE, UNSPECIFIED PATHOLOGICAL FRACTURE PRESENCE: ICD-10-CM

## 2023-06-21 DIAGNOSIS — M25.561 CHRONIC PAIN OF RIGHT KNEE: ICD-10-CM

## 2023-06-21 DIAGNOSIS — M25.562 CHRONIC PAIN OF LEFT KNEE: ICD-10-CM

## 2023-06-21 DIAGNOSIS — G89.29 CHRONIC PAIN OF LEFT KNEE: ICD-10-CM

## 2023-06-21 DIAGNOSIS — M17.11 PATELLOFEMORAL ARTHRITIS OF RIGHT KNEE: ICD-10-CM

## 2023-06-21 DIAGNOSIS — G89.29 CHRONIC PAIN OF RIGHT KNEE: ICD-10-CM

## 2023-06-21 DIAGNOSIS — M17.12 PRIMARY OSTEOARTHRITIS OF LEFT KNEE: Primary | ICD-10-CM

## 2023-06-21 DIAGNOSIS — M17.11 PRIMARY OSTEOARTHRITIS OF RIGHT KNEE: ICD-10-CM

## 2023-06-21 DIAGNOSIS — M35.00 SJOGREN'S SYNDROME, WITH UNSPECIFIED ORGAN INVOLVEMENT (HCC): ICD-10-CM

## 2023-06-21 NOTE — PROGRESS NOTES
Patient: Laureen Samaniego                MRN: [id number]       SSN:   YOB: 1970        AGE: 47 y.o. SEX: female  There is no height or weight on file to calculate BMI. PCP: PROVIDER BJ Will  06/21/23    Ms Walton Lick here today for reevaluation of bilateral knee pain it depends on the day the right versus left she has a significant difficulty with stairs kneeling getting up and down from a chair the shots lasted about a month or so meloxicam is helpful but still she is is interested in something stronger and work and suggest adding some Tylenol to that as well. The some does get pain at night pain with activities of daily living getting up and down from a chair are also difficult inclines as well she is interested in doing some exercising and I think shorter walking and swimming could be quite helpful for her the examination she is tall statured about 5-10 and BMI is at 43 currently she is very pleasant alert oriented affect normal the hips rotate nicely both knees have significant patellofemoral crepitus with terminal extension missing a few degrees extension but but bends fairly well to 115 to 18 degrees. Waylan [place]' sign is negative as well thank you mild joint line tenderness in all 3 compartments slight erythema to the right knee and warmth but normal for the arthritis itself.   Previous x-rays confirm end-stage arthritis patellofemoral and some mild osteopenia we will get a bone density test there was a discussion regarding surgery and I she would like to proceed    Risks and benefits described including but not limited to infection DVT pulmonary embolism anesthetic complications blood loss requiring transfusion chronic pain instability implant longevity arthrofibrosis and also the need for bending and straightening knee on an hourly basis to avoid complications    We did review her rheumatological screening labs and she may have shortens disease we will get

## 2023-07-05 ENCOUNTER — HOSPITAL ENCOUNTER (OUTPATIENT)
Facility: HOSPITAL | Age: 53
Discharge: HOME | End: 2023-07-08
Attending: ORTHOPAEDIC SURGERY
Payer: COMMERCIAL

## 2023-07-05 DIAGNOSIS — M81.0 OSTEOPOROSIS, UNSPECIFIED OSTEOPOROSIS TYPE, UNSPECIFIED PATHOLOGICAL FRACTURE PRESENCE: ICD-10-CM

## 2023-07-05 PROCEDURE — 77080 DXA BONE DENSITY AXIAL: CPT

## 2023-07-13 ENCOUNTER — OFFICE VISIT (OUTPATIENT)
Age: 53
End: 2023-07-13

## 2023-07-13 VITALS — WEIGHT: 293 LBS | HEIGHT: 71 IN | BODY MASS INDEX: 41.02 KG/M2

## 2023-07-13 DIAGNOSIS — G89.29 CHRONIC PAIN OF RIGHT KNEE: ICD-10-CM

## 2023-07-13 DIAGNOSIS — M25.562 CHRONIC PAIN OF LEFT KNEE: ICD-10-CM

## 2023-07-13 DIAGNOSIS — M25.561 CHRONIC PAIN OF RIGHT KNEE: ICD-10-CM

## 2023-07-13 DIAGNOSIS — M17.11 PATELLOFEMORAL ARTHRITIS OF RIGHT KNEE: ICD-10-CM

## 2023-07-13 DIAGNOSIS — M17.11 PRIMARY OSTEOARTHRITIS OF RIGHT KNEE: Primary | ICD-10-CM

## 2023-07-13 DIAGNOSIS — M35.00 SJOGREN'S SYNDROME, WITH UNSPECIFIED ORGAN INVOLVEMENT (HCC): ICD-10-CM

## 2023-07-13 DIAGNOSIS — G89.29 CHRONIC PAIN OF LEFT KNEE: ICD-10-CM

## 2023-07-13 DIAGNOSIS — M17.12 PRIMARY OSTEOARTHRITIS OF LEFT KNEE: ICD-10-CM

## 2023-07-13 RX ORDER — BETAMETHASONE SODIUM PHOSPHATE AND BETAMETHASONE ACETATE 3; 3 MG/ML; MG/ML
6 INJECTION, SUSPENSION INTRA-ARTICULAR; INTRALESIONAL; INTRAMUSCULAR; SOFT TISSUE ONCE
Status: COMPLETED | OUTPATIENT
Start: 2023-07-13 | End: 2023-07-13

## 2023-07-13 RX ORDER — DICLOFENAC EPOLAMINE 0.01 G/1
1 SYSTEM TOPICAL 2 TIMES DAILY
Qty: 60 PATCH | Refills: 1 | Status: SHIPPED | OUTPATIENT
Start: 2023-07-13

## 2023-07-13 RX ADMIN — BETAMETHASONE SODIUM PHOSPHATE AND BETAMETHASONE ACETATE 6 MG: 3; 3 INJECTION, SUSPENSION INTRA-ARTICULAR; INTRALESIONAL; INTRAMUSCULAR; SOFT TISSUE at 09:16

## 2023-07-17 ENCOUNTER — TELEPHONE (OUTPATIENT)
Age: 53
End: 2023-07-17

## 2023-07-17 NOTE — TELEPHONE ENCOUNTER
Spoke with pt--she was referred to Rheumatology--no labs are in system--she will wait for appt with Rheumatology

## 2023-07-17 NOTE — TELEPHONE ENCOUNTER
Patient called stating she was supposed to have more labs done for TEX, but was not informed of where or when to go.      Please advise patient: [phone]

## 2023-08-04 ENCOUNTER — TELEPHONE (OUTPATIENT)
Age: 53
End: 2023-08-04

## 2023-08-04 NOTE — TELEPHONE ENCOUNTER
Patient called requesting a note to have her not kneel on the grown because of her knees. She had to do CPR training this morning and has to kneel on the grown and she stated it was very painful. Pt would like for this to be sent through [employer] when completed. Please advise pt at [phone].

## 2023-08-07 RX ORDER — DICLOFENAC SODIUM 75 MG/1
TABLET, DELAYED RELEASE ORAL
Qty: 120 TABLET | Refills: 2 | Status: SHIPPED | OUTPATIENT
Start: 2023-08-07

## 2023-08-08 ENCOUNTER — TELEPHONE (OUTPATIENT)
Age: 53
End: 2023-08-08

## 2023-08-08 NOTE — TELEPHONE ENCOUNTER
Patient called regarding her referral to Rheumatology. Patient stated she has left messages for scheduling, with no return call. I called the [facility] location the patient requested and was met by a rude staff member. She stated I would have to call the [facility] office and ask to speak to Sweeny. I asked if I could be transferred, in which she told me no. I called [facility] and got a Voicemail (did not leave message). I provided the patient with the [facility] phone number and instructed her to ask if the  could help before asking for Candy.

## 2023-08-18 ENCOUNTER — TELEPHONE (OUTPATIENT)
Age: 53
End: 2023-08-18

## 2023-08-18 DIAGNOSIS — M17.11 PRIMARY OSTEOARTHRITIS OF RIGHT KNEE: Primary | ICD-10-CM

## 2023-08-18 NOTE — TELEPHONE ENCOUNTER
Patient scheduled for Lt BRISA on 10/4/23 and would like to know if she could go ahead and scheduled her Rt knee to be done in Jan 2024. If YOVANY Patterson agrees please put in order, any clearances needed and rep. Pao Barbosa will call patient and get set up.

## 2023-08-21 DIAGNOSIS — M17.11 PRIMARY OSTEOARTHRITIS OF RIGHT KNEE: ICD-10-CM

## 2023-08-21 DIAGNOSIS — Z01.818 PRE-OP TESTING: Primary | ICD-10-CM

## 2023-08-23 NOTE — TELEPHONE ENCOUNTER
Can move forward with getting scheduled however will depend on how she is progressing from the total hip.

## 2023-08-28 DIAGNOSIS — M17.11 PRIMARY OSTEOARTHRITIS OF RIGHT KNEE: ICD-10-CM

## 2023-08-28 DIAGNOSIS — Z01.818 PRE-OP TESTING: ICD-10-CM

## 2023-09-18 ENCOUNTER — HOSPITAL ENCOUNTER (OUTPATIENT)
Facility: HOSPITAL | Age: 53
Discharge: HOME | End: 2023-09-21
Payer: COMMERCIAL

## 2023-09-18 ENCOUNTER — NURSE ONLY (OUTPATIENT)
Age: 53
End: 2023-09-18
Payer: COMMERCIAL

## 2023-09-18 ENCOUNTER — CLINICAL DOCUMENTATION (OUTPATIENT)
Age: 53
End: 2023-09-18

## 2023-09-18 DIAGNOSIS — M17.11 PRIMARY OSTEOARTHRITIS OF RIGHT KNEE: ICD-10-CM

## 2023-09-18 DIAGNOSIS — Z01.818 PRE-OP TESTING: ICD-10-CM

## 2023-09-18 DIAGNOSIS — M17.12 PRIMARY OSTEOARTHRITIS OF LEFT KNEE: Primary | ICD-10-CM

## 2023-09-18 LAB — SENTARA SPECIMEN COLLECTION: NORMAL

## 2023-09-18 PROCEDURE — 71046 X-RAY EXAM CHEST 2 VIEWS: CPT

## 2023-09-18 PROCEDURE — 73564 X-RAY EXAM KNEE 4 OR MORE: CPT | Performed by: PODIATRIST

## 2023-09-18 PROCEDURE — 93005 ELECTROCARDIOGRAM TRACING: CPT

## 2023-09-18 NOTE — PROGRESS NOTES
Patient dropped off [place] paperwork to be completed at hs office and can be reached at [phone] when ready for pickup.

## 2023-09-19 ENCOUNTER — CLINICAL DOCUMENTATION (OUTPATIENT)
Age: 53
End: 2023-09-19

## 2023-09-19 LAB
EKG ATRIAL RATE: 55 BPM
EKG DIAGNOSIS: NORMAL
EKG P AXIS: 46 DEGREES
EKG P-R INTERVAL: 134 MS
EKG Q-T INTERVAL: 448 MS
EKG QRS DURATION: 88 MS
EKG QTC CALCULATION (BAZETT): 428 MS
EKG R AXIS: 63 DEGREES
EKG T AXIS: 63 DEGREES
EKG VENTRICULAR RATE: 55 BPM

## 2023-09-20 ENCOUNTER — OFFICE VISIT (OUTPATIENT)
Age: 53
End: 2023-09-20

## 2023-09-20 VITALS
WEIGHT: 282 LBS | HEIGHT: 71 IN | DIASTOLIC BLOOD PRESSURE: 80 MMHG | SYSTOLIC BLOOD PRESSURE: 140 MMHG | BODY MASS INDEX: 39.48 KG/M2

## 2023-09-20 DIAGNOSIS — M17.12 PRIMARY OSTEOARTHRITIS OF LEFT KNEE: ICD-10-CM

## 2023-09-20 DIAGNOSIS — M17.11 PRIMARY OSTEOARTHRITIS OF RIGHT KNEE: ICD-10-CM

## 2023-09-20 DIAGNOSIS — Z01.818 PRE-OP EXAM: Primary | ICD-10-CM

## 2023-09-20 LAB
ALBUMIN SERPL-MCNC: 4.2 G/DL (ref 3.5–5)
ANION GAP SERPL CALCULATED.3IONS-SCNC: 11 MMOL/L (ref 3–15)
APTT: 26 SEC (ref 22–36)
AVERAGE GLUCOSE: 112 MG/DL (ref 91–123)
BACTERIA: PRESENT
BASOPHILS # BLD: 1 % (ref 0–2)
BASOPHILS ABSOLUTE: 0 K/UL (ref 0–0.2)
BILIRUB SERPL-MCNC: ABNORMAL MG/DL
BLOOD: NEGATIVE
BUN BLDV-MCNC: 11 MG/DL (ref 6–22)
CALCIUM OXALATE CRYSTALS: PRESENT
CALCIUM SERPL-MCNC: 9.8 MG/DL (ref 8.4–10.5)
CHLORIDE BLD-SCNC: 103 MMOL/L (ref 98–110)
CLARITY: ABNORMAL
CO2: 28 MMOL/L (ref 20–32)
COLOR: ABNORMAL
CREAT SERPL-MCNC: 0.8 MG/DL (ref 0.5–1.2)
EOSINOPHIL # BLD: 5 % (ref 0–6)
EOSINOPHILS ABSOLUTE: 0.2 K/UL (ref 0–0.5)
EPITHELIAL CELLS: ABNORMAL /HPF
GLOMERULAR FILTRATION RATE: >60 ML/MIN/1.73 SQ.M.
GLUCOSE: 75 MG/DL (ref 70–99)
GLUCOSE: NEGATIVE MG/DL
HBA1C MFR BLD: 5.5 % (ref 4.8–5.6)
HCT VFR BLD CALC: 41.8 % (ref 35.1–48)
HEMOGLOBIN: 12.6 G/DL (ref 11.7–16)
HYALINE CASTS: ABNORMAL /LPF (ref 0–2)
ICTOTEST - UA HGH: NEGATIVE
INR BLD: 0.97 (ref 0.89–1.29)
KETONES, URINE: ABNORMAL MG/DL
LEUKOCYTE ESTERASE, URINE: ABNORMAL
LYMPHOCYTES # BLD: 45 % (ref 20–45)
LYMPHOCYTES ABSOLUTE: 1.5 K/UL (ref 1–4.8)
MCH RBC QN AUTO: 27 PG (ref 26–34)
MCHC RBC AUTO-ENTMCNC: 30 G/DL (ref 31–36)
MCV RBC AUTO: 90 FL (ref 80–99)
MONOCYTES ABSOLUTE: 0.1 K/UL (ref 0.1–1)
MONOCYTES: 4 % (ref 3–12)
NEUTROPHILS ABSOLUTE: 1.5 K/UL (ref 1.8–7.7)
NEUTROPHILS: 45 % (ref 40–75)
NITRITE, URINE: NEGATIVE
PDW BLD-RTO: 13 % (ref 10–15.5)
PH, URINE: 5.5 PH (ref 5–8)
PLATELET # BLD: 214 K/UL (ref 140–440)
PMV BLD AUTO: 13 FL (ref 9–13)
POTASSIUM SERPL-SCNC: 4.8 MMOL/L (ref 3.5–5.5)
PROTEIN UA: ABNORMAL MG/DL
PROTHROMBIN TIME: 10.8 SEC (ref 9–13)
RBC URINE: ABNORMAL /HPF
RBC: 4.67 M/UL (ref 3.8–5.2)
RESULT: ABNORMAL
SODIUM BLD-SCNC: 142 MMOL/L (ref 133–145)
SPECIFIC GRAVITY: 1.04 (ref 1–1.03)
UROBILINOGEN: 1 MG/DL
WBC UA: ABNORMAL /HPF (ref 0–5)
WBC: 3.4 K/UL (ref 4–11)

## 2023-09-20 RX ORDER — CELECOXIB 100 MG/1
200 CAPSULE ORAL
OUTPATIENT
Start: 2023-09-20 | End: 2023-09-20

## 2023-09-20 RX ORDER — CLINDAMYCIN HYDROCHLORIDE 300 MG/1
CAPSULE ORAL
COMMUNITY
Start: 2023-07-31

## 2023-09-20 RX ORDER — IBUPROFEN 800 MG/1
TABLET ORAL
COMMUNITY
Start: 2023-07-31

## 2023-09-20 RX ORDER — PEN NEEDLE, DIABETIC 32GX 5/32"
NEEDLE, DISPOSABLE MISCELLANEOUS
COMMUNITY
Start: 2023-08-02

## 2023-09-20 RX ORDER — DEXAMETHASONE SODIUM PHOSPHATE 4 MG/ML
8 INJECTION, SOLUTION INTRA-ARTICULAR; INTRALESIONAL; INTRAMUSCULAR; INTRAVENOUS; SOFT TISSUE
OUTPATIENT
Start: 2023-09-20 | End: 2023-09-20

## 2023-09-20 RX ORDER — LIRAGLUTIDE 6 MG/ML
INJECTION SUBCUTANEOUS
COMMUNITY
Start: 2023-07-31

## 2023-09-20 RX ORDER — LIDOCAINE 50 MG/G
PATCH TOPICAL
COMMUNITY
Start: 2023-08-27

## 2023-09-20 RX ORDER — CHLORHEXIDINE GLUCONATE ORAL RINSE 1.2 MG/ML
SOLUTION DENTAL
COMMUNITY
Start: 2023-07-31

## 2023-09-20 RX ORDER — PREGABALIN 25 MG/1
75 CAPSULE ORAL
OUTPATIENT
Start: 2023-09-20 | End: 2023-09-20

## 2023-09-20 RX ORDER — NITROFURANTOIN 25; 75 MG/1; MG/1
100 CAPSULE ORAL 2 TIMES DAILY
Qty: 10 CAPSULE | Refills: 0 | Status: SHIPPED | OUTPATIENT
Start: 2023-09-20 | End: 2023-09-25

## 2023-09-20 RX ORDER — ACETAMINOPHEN 325 MG/1
1000 TABLET ORAL
OUTPATIENT
Start: 2023-09-20 | End: 2023-09-20

## 2023-09-20 RX ORDER — BETAMETHASONE SODIUM PHOSPHATE AND BETAMETHASONE ACETATE 3; 3 MG/ML; MG/ML
6 INJECTION, SUSPENSION INTRA-ARTICULAR; INTRALESIONAL; INTRAMUSCULAR; SOFT TISSUE ONCE
Status: CANCELLED | OUTPATIENT
Start: 2023-09-20 | End: 2023-09-20

## 2023-09-20 NOTE — PATIENT INSTRUCTIONS
Patient: Del Rios                MRN: [id number]       SSN: [id number]  YOB: 1970        AGE: 47 y.o. SEX: female  There is no height or weight on file to calculate BMI.    09/20/23    DO:  1:  Sit with the leg out straight 5-10 min every hour while awake. Keep the toes pointed  up towards the maegan. 2.  Bend your knee 5-10 min every hour. Bend it to the point of pain and hold it for 5-10 Min. 3.  ICE your knee 20 min every hour    4. You can expect to have swelling and discomfort in your thigh down to your knee. Swelling may extend to your foot. You may have bruising from the thigh to the foot as well. Some numbness can be expected to the outside part of the knee. Wear the compression stockings and elevate your leg. DO NOT:    1. Do not place anything under your knee to prop it up  2. Do not sit in the recliner chair.

## 2023-10-02 NOTE — PERIOP NOTE
Instructions for your surgery at [address] [place] [address] Date:  10/2/2023      Patient's Name:  Evans Tatum           Surgery Date:  10/11/2023              Please enter the main entrance of the hospital and check-in at the  located in the [facility]. Once checked in at the , you will take the elevators to the second floor, and report to the waiting room on the left. The room will say Procedure Registration. Do NOT eat or drink anything, including candy, gum, or ice chips after midnight prior to your surgery, unless you have specific instructions from your surgeon or anesthesia provider to do so. Brush your teeth before coming to the hospital. You may swish with water, but do not swallow. No smoking/Vaping/E-Cigarettes 24 hours prior to the day of surgery. No alcohol 24 hours prior to the day of surgery. No recreational drugs for one week prior to the day of surgery. Bring Photo ID, Insurance information, and Co-pay if required on day of surgery. Bring in pertinent legal documents, such as, Medical Power of MATHIEU ARANA, DNR, Advance Directive, etc.  Leave all valuables, including money/purse, at home. Remove all jewelry, including ALL body piercings, nail polish, acrylic nails, and makeup (including mascara); no lotions, powders, deodorant, or perfume/cologne/after shave on the skin. Follow instruction for Hibiclens washes and CHG wipes from surgeon's office. Glasses and dentures may be worn to the hospital. They must be removed prior to surgery. Please bring case/container for glasses or dentures. Contact lenses should not be worn on day of surgery. Call your doctor's office if symptoms of a cold or illness develop within 24-48 hours prior to your surgery. Call your doctor's office if you have any questions concerning insurance or co-pays. 15. AN ADULT (relative or friend 25 years or older) [address]   16. Please make

## 2023-10-10 ENCOUNTER — ANESTHESIA EVENT (OUTPATIENT)
Facility: HOSPITAL | Age: 53
End: 2023-10-10
Payer: COMMERCIAL

## 2023-10-10 ENCOUNTER — TELEPHONE (OUTPATIENT)
Facility: HOSPITAL | Age: 53
End: 2023-10-10

## 2023-10-10 NOTE — TELEPHONE ENCOUNTER
Attempted x 2 to reach patient regarding preoperative education. VM left to return call to coordinator.

## 2023-10-11 ENCOUNTER — ANESTHESIA (OUTPATIENT)
Facility: HOSPITAL | Age: 53
End: 2023-10-11
Payer: COMMERCIAL

## 2023-10-11 ENCOUNTER — HOSPITAL ENCOUNTER (OUTPATIENT)
Facility: HOSPITAL | Age: 53
Setting detail: OBSERVATION
Discharge: HOME | End: 2023-10-11
Attending: ORTHOPAEDIC SURGERY | Admitting: ORTHOPAEDIC SURGERY
Payer: COMMERCIAL

## 2023-10-11 ENCOUNTER — APPOINTMENT (OUTPATIENT)
Facility: HOSPITAL | Age: 53
End: 2023-10-11
Attending: ORTHOPAEDIC SURGERY
Payer: COMMERCIAL

## 2023-10-11 VITALS
HEART RATE: 50 BPM | WEIGHT: 279 LBS | RESPIRATION RATE: 11 BRPM | SYSTOLIC BLOOD PRESSURE: 154 MMHG | TEMPERATURE: 96.9 F | OXYGEN SATURATION: 100 % | HEIGHT: 71 IN | BODY MASS INDEX: 39.06 KG/M2 | DIASTOLIC BLOOD PRESSURE: 93 MMHG

## 2023-10-11 DIAGNOSIS — M17.12 ARTHRITIS OF KNEE, LEFT: Primary | ICD-10-CM

## 2023-10-11 PROBLEM — M17.10 ARTHRITIS OF KNEE: Status: ACTIVE | Noted: 2023-10-11

## 2023-10-11 LAB
HCG UR QL: NEGATIVE
HCG UR QL: POSITIVE

## 2023-10-11 PROCEDURE — 2580000003 HC RX 258: Performed by: NURSE ANESTHETIST, CERTIFIED REGISTERED

## 2023-10-11 PROCEDURE — C1776 JOINT DEVICE (IMPLANTABLE): HCPCS | Performed by: ORTHOPAEDIC SURGERY

## 2023-10-11 PROCEDURE — 97116 GAIT TRAINING THERAPY: CPT

## 2023-10-11 PROCEDURE — C9290 INJ, BUPIVACAINE LIPOSOME: HCPCS | Performed by: ORTHOPAEDIC SURGERY

## 2023-10-11 PROCEDURE — 3600000013 HC SURGERY LEVEL 3 ADDTL 15MIN: Performed by: ORTHOPAEDIC SURGERY

## 2023-10-11 PROCEDURE — C1713 ANCHOR/SCREW BN/BN,TIS/BN: HCPCS | Performed by: ORTHOPAEDIC SURGERY

## 2023-10-11 PROCEDURE — 3700000000 HC ANESTHESIA ATTENDED CARE: Performed by: ORTHOPAEDIC SURGERY

## 2023-10-11 PROCEDURE — 2500000003 HC RX 250 WO HCPCS: Performed by: ANESTHESIOLOGY

## 2023-10-11 PROCEDURE — G0378 HOSPITAL OBSERVATION PER HR: HCPCS

## 2023-10-11 PROCEDURE — 6360000002 HC RX W HCPCS: Performed by: ORTHOPAEDIC SURGERY

## 2023-10-11 PROCEDURE — 6370000000 HC RX 637 (ALT 250 FOR IP): Performed by: PHYSICIAN ASSISTANT

## 2023-10-11 PROCEDURE — 6360000002 HC RX W HCPCS: Performed by: ANESTHESIOLOGY

## 2023-10-11 PROCEDURE — 6360000002 HC RX W HCPCS: Performed by: PHYSICIAN ASSISTANT

## 2023-10-11 PROCEDURE — 97162 PT EVAL MOD COMPLEX 30 MIN: CPT

## 2023-10-11 PROCEDURE — 3600000003 HC SURGERY LEVEL 3 BASE: Performed by: ORTHOPAEDIC SURGERY

## 2023-10-11 PROCEDURE — 2580000003 HC RX 258: Performed by: PHYSICIAN ASSISTANT

## 2023-10-11 PROCEDURE — 7100000000 HC PACU RECOVERY - FIRST 15 MIN: Performed by: ORTHOPAEDIC SURGERY

## 2023-10-11 PROCEDURE — 73560 X-RAY EXAM OF KNEE 1 OR 2: CPT

## 2023-10-11 PROCEDURE — 6360000002 HC RX W HCPCS: Performed by: NURSE ANESTHETIST, CERTIFIED REGISTERED

## 2023-10-11 PROCEDURE — 7100000001 HC PACU RECOVERY - ADDTL 15 MIN: Performed by: ORTHOPAEDIC SURGERY

## 2023-10-11 PROCEDURE — P9045 ALBUMIN (HUMAN), 5%, 250 ML: HCPCS | Performed by: NURSE ANESTHETIST, CERTIFIED REGISTERED

## 2023-10-11 PROCEDURE — 2500000003 HC RX 250 WO HCPCS: Performed by: PHYSICIAN ASSISTANT

## 2023-10-11 PROCEDURE — 2500000003 HC RX 250 WO HCPCS: Performed by: ORTHOPAEDIC SURGERY

## 2023-10-11 PROCEDURE — 3700000001 HC ADD 15 MINUTES (ANESTHESIA): Performed by: ORTHOPAEDIC SURGERY

## 2023-10-11 PROCEDURE — 6370000000 HC RX 637 (ALT 250 FOR IP): Performed by: NURSE ANESTHETIST, CERTIFIED REGISTERED

## 2023-10-11 PROCEDURE — 64447 NJX AA&/STRD FEMORAL NRV IMG: CPT | Performed by: ANESTHESIOLOGY

## 2023-10-11 PROCEDURE — 2580000003 HC RX 258: Performed by: ORTHOPAEDIC SURGERY

## 2023-10-11 PROCEDURE — 2709999900 HC NON-CHARGEABLE SUPPLY: Performed by: ORTHOPAEDIC SURGERY

## 2023-10-11 PROCEDURE — 2720000010 HC SURG SUPPLY STERILE: Performed by: ORTHOPAEDIC SURGERY

## 2023-10-11 PROCEDURE — 81025 URINE PREGNANCY TEST: CPT

## 2023-10-11 PROCEDURE — A4217 STERILE WATER/SALINE, 500 ML: HCPCS | Performed by: ORTHOPAEDIC SURGERY

## 2023-10-11 PROCEDURE — 2500000003 HC RX 250 WO HCPCS: Performed by: NURSE ANESTHETIST, CERTIFIED REGISTERED

## 2023-10-11 DEVICE — GENESIS II NON-POROUS TIBIAL                                    BASEPLATE SIZE 6 LT
Type: IMPLANTABLE DEVICE | Site: KNEE | Status: FUNCTIONAL
Brand: GENESIS II

## 2023-10-11 DEVICE — IMPLANT PAT DIA35MM THK10MM X3 ASYM TRIATHLON: Type: IMPLANTABLE DEVICE | Site: PATELLA | Status: FUNCTIONAL

## 2023-10-11 DEVICE — GENESIS TROCHLEAR PIN 1/8 X 3
Type: IMPLANTABLE DEVICE | Site: KNEE | Status: FUNCTIONAL
Brand: GENESIS

## 2023-10-11 DEVICE — LEGION POSTERIOR STABILIZED HIGH                                    FLEX HIGHLY CROSS LINKED                                    POLYETHYLENE SIZE 5-6 11MM
Type: IMPLANTABLE DEVICE | Site: KNEE | Status: FUNCTIONAL
Brand: LEGION

## 2023-10-11 DEVICE — LEGION PS OXINIUM FEMORAL SZ 7 LEFT
Type: IMPLANTABLE DEVICE | Site: KNEE | Status: FUNCTIONAL
Brand: LEGION

## 2023-10-11 DEVICE — CEMENT BNE 20ML 41GM FULL DOSE PMMA W/ TOBRA M VISC RADPQ: Type: IMPLANTABLE DEVICE | Site: KNEE | Status: FUNCTIONAL

## 2023-10-11 RX ORDER — FAMOTIDINE 20 MG/1
20 TABLET, FILM COATED ORAL ONCE
Status: COMPLETED | OUTPATIENT
Start: 2023-10-11 | End: 2023-10-11

## 2023-10-11 RX ORDER — FENTANYL CITRATE 50 UG/ML
50 INJECTION, SOLUTION INTRAMUSCULAR; INTRAVENOUS EVERY 5 MIN PRN
Status: DISCONTINUED | OUTPATIENT
Start: 2023-10-11 | End: 2023-10-11 | Stop reason: HOSPADM

## 2023-10-11 RX ORDER — ONDANSETRON 2 MG/ML
4 INJECTION INTRAMUSCULAR; INTRAVENOUS EVERY 6 HOURS PRN
Status: DISCONTINUED | OUTPATIENT
Start: 2023-10-11 | End: 2023-10-11 | Stop reason: HOSPADM

## 2023-10-11 RX ORDER — OXYCODONE HYDROCHLORIDE 10 MG/1
10 TABLET ORAL
Status: DISCONTINUED | OUTPATIENT
Start: 2023-10-11 | End: 2023-10-11 | Stop reason: HOSPADM

## 2023-10-11 RX ORDER — LABETALOL HYDROCHLORIDE 5 MG/ML
INJECTION, SOLUTION INTRAVENOUS PRN
Status: DISCONTINUED | OUTPATIENT
Start: 2023-10-11 | End: 2023-10-11 | Stop reason: SDUPTHER

## 2023-10-11 RX ORDER — GLYCOPYRROLATE 0.2 MG/ML
INJECTION INTRAMUSCULAR; INTRAVENOUS PRN
Status: DISCONTINUED | OUTPATIENT
Start: 2023-10-11 | End: 2023-10-11 | Stop reason: SDUPTHER

## 2023-10-11 RX ORDER — SODIUM CHLORIDE 0.9 % (FLUSH) 0.9 %
5-40 SYRINGE (ML) INJECTION PRN
Status: DISCONTINUED | OUTPATIENT
Start: 2023-10-11 | End: 2023-10-11 | Stop reason: HOSPADM

## 2023-10-11 RX ORDER — PROPOFOL 10 MG/ML
INJECTION, EMULSION INTRAVENOUS PRN
Status: DISCONTINUED | OUTPATIENT
Start: 2023-10-11 | End: 2023-10-11 | Stop reason: SDUPTHER

## 2023-10-11 RX ORDER — CELECOXIB 100 MG/1
200 CAPSULE ORAL 2 TIMES DAILY
Status: DISCONTINUED | OUTPATIENT
Start: 2023-10-13 | End: 2023-10-11 | Stop reason: HOSPADM

## 2023-10-11 RX ORDER — ASPIRIN 81 MG/1
81 TABLET ORAL 2 TIMES DAILY
Qty: 60 TABLET | Refills: 3 | Status: SHIPPED | OUTPATIENT
Start: 2023-10-11

## 2023-10-11 RX ORDER — CELECOXIB 100 MG/1
200 CAPSULE ORAL
Status: COMPLETED | OUTPATIENT
Start: 2023-10-11 | End: 2023-10-11

## 2023-10-11 RX ORDER — SODIUM CHLORIDE 9 MG/ML
INJECTION, SOLUTION INTRAVENOUS PRN
Status: DISCONTINUED | OUTPATIENT
Start: 2023-10-11 | End: 2023-10-11 | Stop reason: HOSPADM

## 2023-10-11 RX ORDER — LIDOCAINE HYDROCHLORIDE 10 MG/ML
1 INJECTION, SOLUTION EPIDURAL; INFILTRATION; INTRACAUDAL; PERINEURAL
Status: DISCONTINUED | OUTPATIENT
Start: 2023-10-11 | End: 2023-10-11 | Stop reason: HOSPADM

## 2023-10-11 RX ORDER — ACETAMINOPHEN 500 MG
1000 TABLET ORAL EVERY 6 HOURS
Status: DISCONTINUED | OUTPATIENT
Start: 2023-10-11 | End: 2023-10-11 | Stop reason: HOSPADM

## 2023-10-11 RX ORDER — FENTANYL CITRATE 50 UG/ML
INJECTION, SOLUTION INTRAMUSCULAR; INTRAVENOUS PRN
Status: DISCONTINUED | OUTPATIENT
Start: 2023-10-11 | End: 2023-10-11 | Stop reason: SDUPTHER

## 2023-10-11 RX ORDER — CEFADROXIL 500 MG/1
500 CAPSULE ORAL 2 TIMES DAILY
Qty: 10 CAPSULE | Refills: 0 | Status: SHIPPED | OUTPATIENT
Start: 2023-10-11 | End: 2023-10-16

## 2023-10-11 RX ORDER — SODIUM CHLORIDE 9 MG/ML
INJECTION, SOLUTION INTRAVENOUS CONTINUOUS
Status: DISCONTINUED | OUTPATIENT
Start: 2023-10-11 | End: 2023-10-11 | Stop reason: HOSPADM

## 2023-10-11 RX ORDER — ACETAMINOPHEN 500 MG
1000 TABLET ORAL
Status: COMPLETED | OUTPATIENT
Start: 2023-10-11 | End: 2023-10-11

## 2023-10-11 RX ORDER — DEXTROSE MONOHYDRATE 100 MG/ML
INJECTION, SOLUTION INTRAVENOUS CONTINUOUS PRN
Status: DISCONTINUED | OUTPATIENT
Start: 2023-10-11 | End: 2023-10-11 | Stop reason: HOSPADM

## 2023-10-11 RX ORDER — ROPIVACAINE HYDROCHLORIDE 2 MG/ML
30 INJECTION, SOLUTION EPIDURAL; INFILTRATION; PERINEURAL ONCE
Status: DISCONTINUED | OUTPATIENT
Start: 2023-10-11 | End: 2023-10-11 | Stop reason: HOSPADM

## 2023-10-11 RX ORDER — ONDANSETRON 4 MG/1
4 TABLET, ORALLY DISINTEGRATING ORAL EVERY 6 HOURS PRN
Status: DISCONTINUED | OUTPATIENT
Start: 2023-10-11 | End: 2023-10-11 | Stop reason: HOSPADM

## 2023-10-11 RX ORDER — MIDAZOLAM HYDROCHLORIDE 2 MG/2ML
2 INJECTION, SOLUTION INTRAMUSCULAR; INTRAVENOUS ONCE
Status: COMPLETED | OUTPATIENT
Start: 2023-10-11 | End: 2023-10-11

## 2023-10-11 RX ORDER — KETOROLAC TROMETHAMINE 30 MG/ML
15 INJECTION, SOLUTION INTRAMUSCULAR; INTRAVENOUS EVERY 6 HOURS
Status: DISCONTINUED | OUTPATIENT
Start: 2023-10-11 | End: 2023-10-11 | Stop reason: HOSPADM

## 2023-10-11 RX ORDER — DEXAMETHASONE SODIUM PHOSPHATE 4 MG/ML
INJECTION, SOLUTION INTRA-ARTICULAR; INTRALESIONAL; INTRAMUSCULAR; INTRAVENOUS; SOFT TISSUE PRN
Status: DISCONTINUED | OUTPATIENT
Start: 2023-10-11 | End: 2023-10-11 | Stop reason: SDUPTHER

## 2023-10-11 RX ORDER — CELECOXIB 200 MG/1
200 CAPSULE ORAL 2 TIMES DAILY
Qty: 60 CAPSULE | Refills: 2 | Status: SHIPPED | OUTPATIENT
Start: 2023-10-11

## 2023-10-11 RX ORDER — ONDANSETRON 4 MG/1
4 TABLET, FILM COATED ORAL EVERY 8 HOURS PRN
Qty: 30 TABLET | Refills: 2 | Status: SHIPPED | OUTPATIENT
Start: 2023-10-11

## 2023-10-11 RX ORDER — MAGNESIUM SULFATE HEPTAHYDRATE 500 MG/ML
INJECTION, SOLUTION INTRAMUSCULAR; INTRAVENOUS PRN
Status: DISCONTINUED | OUTPATIENT
Start: 2023-10-11 | End: 2023-10-11 | Stop reason: SDUPTHER

## 2023-10-11 RX ORDER — ALBUMIN, HUMAN INJ 5% 5 %
SOLUTION INTRAVENOUS PRN
Status: DISCONTINUED | OUTPATIENT
Start: 2023-10-11 | End: 2023-10-11 | Stop reason: SDUPTHER

## 2023-10-11 RX ORDER — DEXAMETHASONE SODIUM PHOSPHATE 4 MG/ML
8 INJECTION, SOLUTION INTRA-ARTICULAR; INTRALESIONAL; INTRAMUSCULAR; INTRAVENOUS; SOFT TISSUE
Status: COMPLETED | OUTPATIENT
Start: 2023-10-11 | End: 2023-10-11

## 2023-10-11 RX ORDER — ONDANSETRON 2 MG/ML
INJECTION INTRAMUSCULAR; INTRAVENOUS PRN
Status: DISCONTINUED | OUTPATIENT
Start: 2023-10-11 | End: 2023-10-11 | Stop reason: SDUPTHER

## 2023-10-11 RX ORDER — KETOROLAC TROMETHAMINE 15 MG/ML
30 INJECTION, SOLUTION INTRAMUSCULAR; INTRAVENOUS EVERY 6 HOURS
Status: DISCONTINUED | OUTPATIENT
Start: 2023-10-11 | End: 2023-10-11

## 2023-10-11 RX ORDER — FENTANYL CITRATE 50 UG/ML
100 INJECTION, SOLUTION INTRAMUSCULAR; INTRAVENOUS ONCE
Status: COMPLETED | OUTPATIENT
Start: 2023-10-11 | End: 2023-10-11

## 2023-10-11 RX ORDER — HYDROMORPHONE HYDROCHLORIDE 2 MG/ML
INJECTION, SOLUTION INTRAMUSCULAR; INTRAVENOUS; SUBCUTANEOUS PRN
Status: DISCONTINUED | OUTPATIENT
Start: 2023-10-11 | End: 2023-10-11 | Stop reason: SDUPTHER

## 2023-10-11 RX ORDER — MIDAZOLAM HYDROCHLORIDE 1 MG/ML
INJECTION INTRAMUSCULAR; INTRAVENOUS PRN
Status: DISCONTINUED | OUTPATIENT
Start: 2023-10-11 | End: 2023-10-11 | Stop reason: SDUPTHER

## 2023-10-11 RX ORDER — SODIUM CHLORIDE, SODIUM LACTATE, POTASSIUM CHLORIDE, CALCIUM CHLORIDE 600; 310; 30; 20 MG/100ML; MG/100ML; MG/100ML; MG/100ML
INJECTION, SOLUTION INTRAVENOUS CONTINUOUS
Status: DISCONTINUED | OUTPATIENT
Start: 2023-10-11 | End: 2023-10-11 | Stop reason: HOSPADM

## 2023-10-11 RX ORDER — SUCCINYLCHOLINE/SOD CL,ISO/PF 100 MG/5ML
SYRINGE (ML) INTRAVENOUS PRN
Status: DISCONTINUED | OUTPATIENT
Start: 2023-10-11 | End: 2023-10-11 | Stop reason: SDUPTHER

## 2023-10-11 RX ORDER — NEOSTIGMINE METHYLSULFATE 1 MG/ML
INJECTION, SOLUTION INTRAVENOUS PRN
Status: DISCONTINUED | OUTPATIENT
Start: 2023-10-11 | End: 2023-10-11

## 2023-10-11 RX ORDER — ASPIRIN 81 MG/1
81 TABLET ORAL 2 TIMES DAILY
Status: DISCONTINUED | OUTPATIENT
Start: 2023-10-12 | End: 2023-10-11 | Stop reason: HOSPADM

## 2023-10-11 RX ORDER — SODIUM CHLORIDE 0.9 % (FLUSH) 0.9 %
5-40 SYRINGE (ML) INJECTION EVERY 12 HOURS SCHEDULED
Status: DISCONTINUED | OUTPATIENT
Start: 2023-10-11 | End: 2023-10-11 | Stop reason: HOSPADM

## 2023-10-11 RX ORDER — ROCURONIUM BROMIDE 10 MG/ML
INJECTION, SOLUTION INTRAVENOUS PRN
Status: DISCONTINUED | OUTPATIENT
Start: 2023-10-11 | End: 2023-10-11 | Stop reason: SDUPTHER

## 2023-10-11 RX ORDER — POLYETHYLENE GLYCOL 3350 17 G/17G
17 POWDER, FOR SOLUTION ORAL DAILY PRN
Status: DISCONTINUED | OUTPATIENT
Start: 2023-10-11 | End: 2023-10-11 | Stop reason: HOSPADM

## 2023-10-11 RX ORDER — LIDOCAINE HYDROCHLORIDE 20 MG/ML
INJECTION, SOLUTION EPIDURAL; INFILTRATION; INTRACAUDAL; PERINEURAL PRN
Status: DISCONTINUED | OUTPATIENT
Start: 2023-10-11 | End: 2023-10-11 | Stop reason: SDUPTHER

## 2023-10-11 RX ORDER — PREGABALIN 75 MG/1
75 CAPSULE ORAL
Status: COMPLETED | OUTPATIENT
Start: 2023-10-11 | End: 2023-10-11

## 2023-10-11 RX ORDER — ONDANSETRON 2 MG/ML
4 INJECTION INTRAMUSCULAR; INTRAVENOUS
Status: DISCONTINUED | OUTPATIENT
Start: 2023-10-11 | End: 2023-10-11 | Stop reason: HOSPADM

## 2023-10-11 RX ORDER — DIPHENHYDRAMINE HYDROCHLORIDE 50 MG/ML
12.5 INJECTION INTRAMUSCULAR; INTRAVENOUS
Status: DISCONTINUED | OUTPATIENT
Start: 2023-10-11 | End: 2023-10-11 | Stop reason: HOSPADM

## 2023-10-11 RX ORDER — DOCUSATE SODIUM 100 MG/1
100 CAPSULE, LIQUID FILLED ORAL 2 TIMES DAILY
Qty: 60 CAPSULE | Refills: 1 | Status: SHIPPED | OUTPATIENT
Start: 2023-10-11 | End: 2023-12-10

## 2023-10-11 RX ORDER — SENNA AND DOCUSATE SODIUM 50; 8.6 MG/1; MG/1
1 TABLET, FILM COATED ORAL 2 TIMES DAILY
Status: DISCONTINUED | OUTPATIENT
Start: 2023-10-11 | End: 2023-10-11 | Stop reason: HOSPADM

## 2023-10-11 RX ORDER — OXYCODONE HYDROCHLORIDE AND ACETAMINOPHEN 5; 325 MG/1; MG/1
1-2 TABLET ORAL EVERY 6 HOURS PRN
Qty: 40 TABLET | Refills: 0 | Status: SHIPPED | OUTPATIENT
Start: 2023-10-11 | End: 2023-10-18

## 2023-10-11 RX ORDER — INSULIN LISPRO 100 [IU]/ML
0-8 INJECTION, SOLUTION INTRAVENOUS; SUBCUTANEOUS
Status: DISCONTINUED | OUTPATIENT
Start: 2023-10-11 | End: 2023-10-11 | Stop reason: HOSPADM

## 2023-10-11 RX ORDER — ROPIVACAINE HYDROCHLORIDE 2 MG/ML
INJECTION, SOLUTION EPIDURAL; INFILTRATION; PERINEURAL
Status: COMPLETED | OUTPATIENT
Start: 2023-10-11 | End: 2023-10-11

## 2023-10-11 RX ADMIN — FAMOTIDINE 20 MG: 20 TABLET, FILM COATED ORAL at 10:33

## 2023-10-11 RX ADMIN — MAGNESIUM SULFATE HEPTAHYDRATE 2 G: 500 INJECTION, SOLUTION INTRAMUSCULAR; INTRAVENOUS at 11:30

## 2023-10-11 RX ADMIN — PREGABALIN 75 MG: 75 CAPSULE ORAL at 10:33

## 2023-10-11 RX ADMIN — DEXMEDETOMIDINE HYDROCHLORIDE 8 MCG: 100 INJECTION, SOLUTION INTRAVENOUS at 11:40

## 2023-10-11 RX ADMIN — FENTANYL CITRATE 100 MCG: 50 INJECTION, SOLUTION INTRAMUSCULAR; INTRAVENOUS at 10:36

## 2023-10-11 RX ADMIN — FENTANYL CITRATE 100 MCG: 50 INJECTION INTRAMUSCULAR; INTRAVENOUS at 11:30

## 2023-10-11 RX ADMIN — ACETAMINOPHEN 1000 MG: 500 TABLET ORAL at 10:33

## 2023-10-11 RX ADMIN — LABETALOL HYDROCHLORIDE 5 MG: 5 INJECTION, SOLUTION INTRAVENOUS at 11:25

## 2023-10-11 RX ADMIN — ROPIVACAINE HYDROCHLORIDE 30 ML: 2 INJECTION, SOLUTION EPIDURAL; INFILTRATION at 10:37

## 2023-10-11 RX ADMIN — HYDROMORPHONE HYDROCHLORIDE 0.5 MG: 2 INJECTION INTRAMUSCULAR; INTRAVENOUS; SUBCUTANEOUS at 11:47

## 2023-10-11 RX ADMIN — PROPOFOL 200 MG: 10 INJECTION, EMULSION INTRAVENOUS at 10:53

## 2023-10-11 RX ADMIN — HYDROMORPHONE HYDROCHLORIDE 0.5 MG: 2 INJECTION INTRAMUSCULAR; INTRAVENOUS; SUBCUTANEOUS at 11:07

## 2023-10-11 RX ADMIN — DEXAMETHASONE SODIUM PHOSPHATE 8 MG: 4 INJECTION INTRA-ARTICULAR; INTRALESIONAL; INTRAMUSCULAR; INTRAVENOUS; SOFT TISSUE at 11:41

## 2023-10-11 RX ADMIN — FENTANYL CITRATE 100 MCG: 50 INJECTION INTRAMUSCULAR; INTRAVENOUS at 10:53

## 2023-10-11 RX ADMIN — WATER 3000 MG: 1 INJECTION, SOLUTION INTRAMUSCULAR; INTRAVENOUS; SUBCUTANEOUS at 11:00

## 2023-10-11 RX ADMIN — LIDOCAINE HYDROCHLORIDE 3 ML: 20 INJECTION, SOLUTION EPIDURAL; INFILTRATION; INTRACAUDAL; PERINEURAL at 10:36

## 2023-10-11 RX ADMIN — TRANEXAMIC ACID 1000 MG: 100 INJECTION, SOLUTION INTRAVENOUS at 12:36

## 2023-10-11 RX ADMIN — ROCURONIUM BROMIDE 40 MG: 10 INJECTION, SOLUTION INTRAVENOUS at 11:00

## 2023-10-11 RX ADMIN — TRANEXAMIC ACID 1000 MG: 100 INJECTION, SOLUTION INTRAVENOUS at 11:00

## 2023-10-11 RX ADMIN — HYDROMORPHONE HYDROCHLORIDE 0.5 MG: 2 INJECTION INTRAMUSCULAR; INTRAVENOUS; SUBCUTANEOUS at 11:33

## 2023-10-11 RX ADMIN — PROPOFOL 50 MG: 10 INJECTION, EMULSION INTRAVENOUS at 11:01

## 2023-10-11 RX ADMIN — ROCURONIUM BROMIDE 10 MG: 10 INJECTION, SOLUTION INTRAVENOUS at 10:53

## 2023-10-11 RX ADMIN — SODIUM CHLORIDE, POTASSIUM CHLORIDE, SODIUM LACTATE AND CALCIUM CHLORIDE: 600; 310; 30; 20 INJECTION, SOLUTION INTRAVENOUS at 10:22

## 2023-10-11 RX ADMIN — Medication 100 MG: at 10:53

## 2023-10-11 RX ADMIN — DEXAMETHASONE SODIUM PHOSPHATE 8 MG: 4 INJECTION, SOLUTION INTRAMUSCULAR; INTRAVENOUS at 10:43

## 2023-10-11 RX ADMIN — DEXMEDETOMIDINE HYDROCHLORIDE 4 MCG: 100 INJECTION, SOLUTION INTRAVENOUS at 11:20

## 2023-10-11 RX ADMIN — LABETALOL HYDROCHLORIDE 5 MG: 5 INJECTION, SOLUTION INTRAVENOUS at 11:30

## 2023-10-11 RX ADMIN — ONDANSETRON 4 MG: 2 INJECTION INTRAMUSCULAR; INTRAVENOUS at 10:53

## 2023-10-11 RX ADMIN — CELECOXIB 200 MG: 100 CAPSULE ORAL at 10:33

## 2023-10-11 RX ADMIN — GLYCOPYRROLATE 0.2 MG: 0.2 INJECTION INTRAMUSCULAR; INTRAVENOUS at 10:50

## 2023-10-11 RX ADMIN — HYDROMORPHONE HYDROCHLORIDE 0.5 MG: 2 INJECTION INTRAMUSCULAR; INTRAVENOUS; SUBCUTANEOUS at 10:53

## 2023-10-11 RX ADMIN — MIDAZOLAM 2 MG: 1 INJECTION, SOLUTION INTRAMUSCULAR; INTRAVENOUS at 10:50

## 2023-10-11 RX ADMIN — ALBUMIN (HUMAN) 12.5 G: 12.5 SOLUTION INTRAVENOUS at 11:05

## 2023-10-11 RX ADMIN — MIDAZOLAM 2 MG: 1 INJECTION INTRAMUSCULAR; INTRAVENOUS at 10:36

## 2023-10-11 RX ADMIN — ONDANSETRON 4 MG: 2 INJECTION INTRAMUSCULAR; INTRAVENOUS at 12:32

## 2023-10-11 RX ADMIN — DEXMEDETOMIDINE HYDROCHLORIDE 8 MCG: 100 INJECTION, SOLUTION INTRAVENOUS at 10:53

## 2023-10-11 ASSESSMENT — PAIN SCALES - WONG BAKER
WONGBAKER_NUMERICALRESPONSE: 0
WONGBAKER_NUMERICALRESPONSE: 0

## 2023-10-11 ASSESSMENT — PAIN - FUNCTIONAL ASSESSMENT: PAIN_FUNCTIONAL_ASSESSMENT: 0-10

## 2023-10-11 NOTE — BRIEF OP NOTE
Brief Postoperative Note      Patient: Tan Patel  YOB: 1970  MRN: [id number]    Date of Procedure: 10/11/2023    Pre-Op Diagnosis Codes:     * Osteoarthritis of left knee, unspecified osteoarthritis type [M17.12] with obesity    Post-Op Diagnosis: Same       Procedure(s):  LEFT TOTAL KNEE ARTHROPLASTY; [GOULD&NEPHEW Roxene Like; OSBORNE TO ASSIST; NERVE BLOCK; 23 HR    Surgeon(s):  Freddie Noe MD    Assistant:  Surgical Assistant: Kaleigh Romero  Physician Assistant: Carlyn Shipley PA-C    Anesthesia: General    Estimated Blood Loss (mL): less than 50     Complications: None    Specimens:   * No specimens in log *    Implants:  Implant Name Type Inv. Item Serial No.  Lot No. LRB No. Used Action   CEMENT BNE 20ML 41GM FULL DOSE PMMA W/ TOBRA M VISC RADPQ - [id number]  CEMENT BNE 20ML 41GM FULL DOSE PMMA W/ TOBRA M VISC RADPQ  [employer]- [id number] Left 2 Implanted   BASEPLATE TIB SZ 6 [id number] ML77MM THK2. 3MM L KNEE TI ALLY NP - [id number]  BASEPLATE TIB SZ 6 [id number] ML77MM THK2. 3MM L KNEE TI ALLY NP  [facility] [employer]- [id number] Left 1 Implanted   COMPONENT FEM SZ 7 L KNEE OXINIUM POST STBL FELISA LEGION - [id number]  COMPONENT FEM SZ 7 L KNEE OXINIUM POST STBL FELISA LEGION  [employer]- [id number] Left 1 Implanted   IMPLANT PAT [id number] X3 ASYM TRIATHLON - [id number]  IMPLANT PAT [id number] X3 ASYM TRIATHLON  [employer]-WD [id number] Left 1 Implanted   INSERT TIB SZ 5-6 [id number] KNEE XLPE POST STBL HI FLX LEGION - [id number]  INSERT TIB SZ 5-6 [id number] KNEE XLPE POST STBL HI FLX LEGION  [employer] [id number] Left 1 Implanted         Drains: * No LDAs found *    Findings: same      Electronically signed by Freddie Noe MD on 10/11/2023 at 12:36 PM

## 2023-10-11 NOTE — ANESTHESIA PROCEDURE NOTES
Peripheral Block    Patient location during procedure: post-op  Reason for block: post-op pain management and at surgeon's request  Start time: 10/11/2023 10:35 AM  End time: 10/11/2023 10:40 AM  Staffing  Anesthesiologist: Munira Hernandez MD  Performed by: Munira Hernandez MD  Authorized by: Munira Hernandez MD    Preanesthetic Checklist  Completed: patient identified, IV checked, site marked, risks and benefits discussed, surgical/procedural consents, equipment checked, timeout performed, anesthesia consent given, oxygen available, monitors applied/VS acknowledged and fire risk safety assessment completed and verbalized  Peripheral Block   Patient position: supine  Prep: ChloraPrep  Patient monitoring: cardiac monitor, continuous pulse ox, oxygen, responsive to questions and frequent blood pressure checks  Block type: Femoral  Adductor canal  Laterality: left  Injection technique: single-shot  Guidance: ultrasound guided  Local infiltration: ropivacaine  Local infiltration: ropivacaine    Needle   Needle type: insulated echogenic nerve stimulator needle   Needle gauge: 22 G  Needle localization: nerve stimulator and ultrasound guidance  Assessment   Injection assessment: negative aspiration for heme, no paresthesia on injection, local visualized surrounding nerve on ultrasound and no intravascular symptoms  Hemodynamics: stable  Real-time US image taken/store: yes  Outcomes: uncomplicated    Medications Administered  ropivacaine (NAROPIN) injection 0.2% - Perineural   30 mL - 10/11/2023 10:37:00 AM

## 2023-10-11 NOTE — PERIOP NOTE
TRANSFER - OUT REPORT:    Verbal report given to Robert Hernandez RN on Joanne Mckeon  being transferred to [address] for routine post-op       Report consisted of patient's Situation, Background, Assessment and   Recommendations(SBAR). Information from the following report(s) Surgery Report and Recent Results was reviewed with the receiving nurse. Lines:   Peripheral IV 10/11/23 Left;Posterior Hand (Active)   Site Assessment Clean, dry & intact 10/11/23 1320   Line Status Infusing 10/11/23 [address] [facility] Connections checked and tightened 10/11/23 1320   Phlebitis Assessment No symptoms 10/11/23 1320   Infiltration Assessment 0 10/11/23 1320   Dressing Status Clean, dry & intact 10/11/23 1320   Dressing Type Transparent 10/11/23 1320        Opportunity for questions and clarification was provided.       Patient transported with:  O2 @ 3lpm and Tech

## 2023-10-11 NOTE — PROGRESS NOTES
level with bedroom/bathroom  Home Access: Stairs to enter with rails  Entrance Stairs - Number of Steps: 5  Home Equipment: Yohan Ruts, rolling  Critical Behavior:  Orientation  Orientation Level: Oriented X4    BLE Strength:    Strength: Generally decreased, functional    BLE Range Of Motion:   AROM: Generally decreased, functional    Functional Mobility:  Bed Mobility:  Bed Mobility Training  Bed Mobility Training: Yes  Supine to Sit: Supervision  Transfers:  Transfer Training  Transfer Training: Yes  Sit to Stand: Supervision; Additional time  Stand to Sit: Supervision; Additional time  Balance:   Balance  Sitting: Intact  Standing: Impaired  Standing - Static: Good  Standing - Dynamic: Good  Ambulation/Gait Training:  Gait  Overall Level of Assistance: Supervision  Distance (ft): 150 Feet  Assistive Device: Walker, rolling  Rail Use: Both  Stairs - Level of Assistance: Supervision  Number of Stairs Trained: 4    Pain:  Pain level pre-treatment: 8/10   Pain level post-treatment: 8/10     Activity Tolerance:   Activity Tolerance: Patient tolerated evaluation without incident    After treatment:   [x]         Patient left in no apparent distress sitting up in chair  [x]         Patient left in no apparent distress in bed  [x]         Call bell left within reach  []         Nursing notified  []         Caregiver present  []         Bed alarm activated  []         Chair alarm activated  []         SCDs applied    COMMUNICATION/EDUCATION:   Patient Education  Education Given To: Patient  Education Provided: Role of Therapy;Plan of Care  Education Method: Demonstration;Verbal;Teach Back  Barriers to Learning: None  Education Outcome: Verbalized understanding;Demonstrated understanding;Continued education needed    Thank you for this referral.  Sydney Carranza, PT  Minutes: 59    Eval Complexity: Decision Making: Medium Complexity

## 2023-10-11 NOTE — ANESTHESIA POSTPROCEDURE EVALUATION
Department of Anesthesiology  Postprocedure Note    Patient: Katharina Barillas  MRN: [id number]  YOB: 1970  Date of evaluation: 10/11/2023      Procedure Summary     Date: 10/11/23 Room / Location: [facility] MAIN 06 / [facility] MAIN OR    Anesthesia Start: 1050 Anesthesia Stop: 0814    Procedure: LEFT TOTAL KNEE ARTHROPLASTY; [SMITH&NEPHEW ORTHO]; Ramon Kemp TO ASSIST; NERVE BLOCK; 23 HR (Left: Knee) Diagnosis:       Osteoarthritis of left knee, unspecified osteoarthritis type      (Osteoarthritis of left knee, unspecified osteoarthritis type [M17.12])    Surgeons: Christy Souza MD Responsible Provider: Alex Alrdich MD    Anesthesia Type: General ASA Status: 2          Anesthesia Type: General    Kofi Phase I: Kofi Score: 8    Kofi Phase II:        Anesthesia Post Evaluation    Patient location during evaluation: bedside  Patient participation: complete - patient participated  Level of consciousness: awake  Pain score: 0  Airway patency: patent  Nausea & Vomiting: no nausea  Complications: no  Cardiovascular status: hemodynamically stable  Respiratory status: acceptable  Hydration status: euvolemic  Pain management: adequate Unknown if ever smoked

## 2023-10-11 NOTE — OP NOTE
needle counts were correct. No complications. We had an excellent result, very stable knee.       Sohan Chowdhury MD [id number]  D:  10/11/2023 12:52  T:  10/11/2023 14:29  JOB #:  [id number]

## 2023-10-11 NOTE — NURSE NAVIGATOR
Rounded on patient s/p left total knee replacement with Dr. Tom Pereira, [facility] 10/11/2023. Patient observed to be alert and oriented x 3, sitting up in bed. She denies chest pain, shortness of breath, nausea, vomiting or calf pain. She states that pain is well controlled at present. She denies any numbness to her left lower extremity, she is able to lift her left leg purposefully. Ace wrap noted to left knee, dressing underneath observed to be clean, dry and intact. Patient provided with total knee replacement education book and leyla hose. Patient reminded that leyla hose are for daytime wear only to assist with swelling. Reviewed the use of incentive spirometry. Encouraged use ten times hourly while in hospital and to continue use at home in the morning hours to keep lungs expanded and free from complications. Reviewed postoperative showering instructions. Patient reminded that she may shower in two days, no tubs or submersion in water. She is to contact clinic with any dressing issues. Education provided regarding the importance of ambulation to recovery as well as in the prevention of complications such as DVT. Encouraged hourly ambulation 5- 10 minutes every hour, followed by ice and elevation to help with pain and swelling. Patient verbalized understanding of all information provided. Patient verbalized understanding of all information provided. All questions answered. Will continue to monitor.

## 2023-10-11 NOTE — CARE COORDINATION
SW reviewed patient's chart and home care consult orders. [facility]. Patient to discharge home w/BS.     Taylor Enrique, OLIVER   Case Management

## 2023-10-11 NOTE — INTERVAL H&P NOTE
Update History & Physical    The patient's History and Physical of October 11, 2023 was reviewed with the patient and I examined the patient. There was no change. The surgical site was confirmed by the patient and me. Plan: The risks, benefits, expected outcome, and alternative to the recommended procedure have been discussed with the patient. Patient understands and wants to proceed with the procedure.      Electronically signed by Esteban Rinaldi MD on 10/11/2023 at 10:28 AM

## 2023-10-11 NOTE — PERIOP NOTE
Patient Zachary Delgadillo has been informed that [facility] is not responsible for patient belongings per policy and the signed [facility] Patient Agreement document. Personal items should be sent home or checked in with security. Patient Zachary Delgadillo selected the following action:                            [x]  Send personal items home with a family member or friend                                                 []  Check in personal items with security, excluding clothing                            []  Maintain personal items at the bedside, against recommendation                                 by [address] [place] [address]                                   ** If patient Perez Player chooses to maintain personal items at the bedside,                                      Complete the patient belongings inventory in the EMR. Belongings are with Sedrick Linder, boyfriend.   Contact number: [phone]

## 2023-10-11 NOTE — DISCHARGE SUMMARY
10/11/2023  8:59 AM    10/11/2023, 1:06 PM    Primary Dx:left Orthopedic / Rheumatologic: Total Knee Replacement  Secondary Dx: Etiological Diagnoses: none    HPI:  Pt has end stage OA of their left knee and had failed conservative treatment. Due to the current findings and affected activity of daily living surgical intervention is indicated.   The alternatives, risks, complications as well as expected outcome were discussed, the patient understands and wishes to proceed with surgery    Past Medical History:   Diagnosis Date    DVT (deep venous thrombosis) ([address]) 2003    History of pulmonary embolus (PE) 12/2017    bilateral         Current Facility-Administered Medications:     lidocaine PF 1 % injection 1 mL, 1 mL, IntraDERmal, Once PRN, Seb Albrightva Medico, APRN - CRNA    lactated ringers IV soln infusion, , IntraVENous, Continuous, CarSeb lizamava Medico, APRN - CRNA, Stopped at 10/11/23 1232    sodium chloride flush 0.9 % injection 5-40 mL, 5-40 mL, IntraVENous, PRN, Seb Albrightva Medico, APRN - CRNA    ropivacaine (NAROPIN) 0.2% injection 0.2% 30 mL, 30 mL, Other, Once, Caras, Kaley Medico, APRN - CRNA    bupivacaine liposome (EXPAREL) 1.3 % injection 66.5 mg, 5 mL, SubCUTAneous, Once, [employer], PA-C    sod chloride IRR soln 0.9 % 250 mL irrigation, , , PRN, Berle Babinski, MD, Given at 10/11/23 1120    sod chloride IRR soln 0.9 % 1,000 mL irrigation, , , PRN, Berle Babinski, MD, Given at 10/11/23 1120    sodium chloride 0.9 % 50 mL with bupivacaine (PF) (MARCAINE) 0.5 % 20 mL, bupivacaine liposome (EXPAREL) 25 mL, EPINEPHrine 1 MG/ML 0.5 mg, ketorolac (TORADOL) 30 MG/ML 2 mg, , , PRN, Berle Babinski, MD, 97.5 mL at 10/11/23 1127    sodium chloride 0.9 % 1,000 mL with vancomycin (VANCOCIN) 1,000 mg, polymyxin B 500,000 Units, , , PRN, Berle Babinski, MD, Given at 10/11/23 1120    Facility-Administered Medications Ordered in Other Encounters:     lidocaine PF 2 % injection, , IntraVENous, PRN, Treasure Sawyer MD, 3 mL at

## 2023-10-12 ENCOUNTER — TELEPHONE (OUTPATIENT)
Facility: HOSPITAL | Age: 53
End: 2023-10-12

## 2023-10-12 ENCOUNTER — HOME HEALTH ADMISSION (OUTPATIENT)
Age: 53
End: 2023-10-12
Payer: COMMERCIAL

## 2023-10-12 NOTE — TELEPHONE ENCOUNTER
Call placed to patient, ID verified x 2. Patient is s/p left total knee replacement with Dr. Piero Salcedo, [facility] 10/11/2023. She denies chest pain, shortness of breath, nausea, vomiting, fever , chills or calf pain. She denies any residual numbness in her left lower extremity, she denies difficulty with bladder, she is passing gas. She states that pain is well controlled at present taking one norco every 6 hours as needed for pain. She is icing and elevating to assist with pain and swelling. She states that she is ambulating hourly and that her dressing remains clean, dry and intact but that it is lifting at the bottom. Home health contacted and will reach out to patient today as patient discharged late last night and they did not have time to set up services with patient. Patient has all required DME at home and a support system in place. Overall she feels she is doing well. She has no questions or concerns at this time. She will follow up with Dr. Piero Salcedo in two weeks or sooner if needed.

## 2023-10-12 NOTE — HOME CARE
Late Entry:  10/12/23  Received home health referral for [facility] for (PT). Patient discharged after hours - less thank 24 hours post op. Spoke with patient via phone;  patient identifiers verified. Explained home health care services and routines. Demographics verified including insurance, phone and address confirmed. Patient has the following DME: has all needed equipment at this time.   Caregivers:   has family available to assist.  Orders noted and arranged and sent to central intake and scheduling.      ---   Yesenia Peters LPN  [facility] - INPATIENT Liaison

## 2023-10-13 ENCOUNTER — TELEPHONE (OUTPATIENT)
Age: 53
End: 2023-10-13

## 2023-10-13 ENCOUNTER — HOME CARE VISIT (OUTPATIENT)
Age: 53
End: 2023-10-13
Payer: COMMERCIAL

## 2023-10-13 VITALS
OXYGEN SATURATION: 97 % | RESPIRATION RATE: 14 BRPM | HEART RATE: 67 BPM | TEMPERATURE: 97.8 F | SYSTOLIC BLOOD PRESSURE: 148 MMHG | DIASTOLIC BLOOD PRESSURE: 90 MMHG

## 2023-10-13 PROCEDURE — G0151 HHCP-SERV OF PT,EA 15 MIN: HCPCS

## 2023-10-13 PROCEDURE — 400013 HH SOC

## 2023-10-13 RX ORDER — CEFADROXIL 500 MG/1
500 CAPSULE ORAL 2 TIMES DAILY
Qty: 10 CAPSULE | Refills: 0 | Status: SHIPPED | OUTPATIENT
Start: 2023-10-13 | End: 2023-10-18

## 2023-10-13 ASSESSMENT — ENCOUNTER SYMPTOMS: DYSPNEA ACTIVITY LEVEL: AFTER AMBULATING MORE THAN 20 FT

## 2023-10-13 NOTE — TELEPHONE ENCOUNTER
[facility]    Continue current pain control regimen.   Can add tylenol as directed with max dose between 3000-4000mg daily

## 2023-10-13 NOTE — HOME HEALTH
Skilled services/Home bound verification:     Skilled Reason for admission/summary of clinical condition:  As per hospital d/c summary, patient is a 48 y/o female who s/p L TOTAL KNEE ARTHROPLASTY on 10/11/23 by  Cody Forbes MD  and was d/c to home on 10/11/23. This patient is homebound for the following reasons Requires considerable and taxing effort to leave the home , Requires the assistance of 1 or more persons to leave the home  and only leaves the home for medical reasons or Taoism services and are infrequent and of short duration for other reasons . Caregiver: Daughter. Caregiver assists with Discussed with patient and caregiver(s) availability and willingness to provide care. Primary caregiver is available regular nighttime and occasional short tern during the day and is able to assist with bathing, dressing, walking, turn in bed, bathroom, meal prep and setup, medication management, grocery shopping, household chores, transportation to MD appointment, home exercise program, transferring from bed to chair and transferring to assistive device. Medications reconciled and all medications are not available in the home this visit. Duricef not present during this visit, per patient she's waiting for pharmacy call back when it's ready. The following education was provided regarding medications: Aspirin, Duricef, Percocet    Medications  are EFFECTIVE at this time. High risk medication teaching regarding anticoagulants, hyperglycemic agents or opioids narcotics performed (specify) Aspirin- Instructed patient/caregiver to notify SN/PT of any signs and symptoms of antiplatelet adverse effects including SOB, bleeding longer/heavier with cuts, bruising, nose bleeds, and/or upset stomach. [address] [place] [address]  patient/caregiver to notify SN/PT of any adverse effects of antibiotic medications including rash, dizziness, nausea, diarrhea, or yeast infections.   Percocet- Instructed patient/caregiver

## 2023-10-13 NOTE — TELEPHONE ENCOUNTER
Patient advised she is still in a lot of pain, she takes the pain pill every 6 hrs and in 2 hours her pain returns, she was unable to get Cefadroxil @ [facility], they didn't have it, she would like to know if it can be switched to [facility] @ [address] [place] [address]

## 2023-10-14 ENCOUNTER — HOME CARE VISIT (OUTPATIENT)
Age: 53
End: 2023-10-14
Payer: COMMERCIAL

## 2023-10-14 PROCEDURE — G0157 HHC PT ASSISTANT EA 15: HCPCS

## 2023-10-15 ENCOUNTER — HOME CARE VISIT (OUTPATIENT)
Age: 53
End: 2023-10-15
Payer: COMMERCIAL

## 2023-10-15 PROCEDURE — G0157 HHC PT ASSISTANT EA 15: HCPCS

## 2023-10-16 ENCOUNTER — HOME CARE VISIT (OUTPATIENT)
Age: 53
End: 2023-10-16
Payer: COMMERCIAL

## 2023-10-16 VITALS
TEMPERATURE: 98.2 F | SYSTOLIC BLOOD PRESSURE: 126 MMHG | OXYGEN SATURATION: 97 % | OXYGEN SATURATION: 98 % | TEMPERATURE: 97.7 F | SYSTOLIC BLOOD PRESSURE: 128 MMHG | RESPIRATION RATE: 13 BRPM | HEART RATE: 67 BPM | HEART RATE: 63 BPM | RESPIRATION RATE: 14 BRPM | DIASTOLIC BLOOD PRESSURE: 72 MMHG | DIASTOLIC BLOOD PRESSURE: 72 MMHG

## 2023-10-16 PROCEDURE — G0151 HHCP-SERV OF PT,EA 15 MIN: HCPCS

## 2023-10-16 ASSESSMENT — ENCOUNTER SYMPTOMS
PAIN LOCATION - PAIN QUALITY: SORENESS, TIGHTNESS
PAIN LOCATION - PAIN QUALITY: SORENESS, TIGHTNESS

## 2023-10-16 NOTE — HOME HEALTH
Subjective: Patient relays that she was able to complete her given HEP as instructed again yesterday. She states that she has been icing regularly. Objective: Skilled home health physical therapy interventions completed today listed in care plan section. Interventions performed today to assist in return to prior level of function, address deficits as apparent upon time of initial evaluation, return to community and personal activities, and progress further towards goals as previously established in plan of care. There are not any changes to medications upon timing of this visit. Home health supplies were not ordered/delivered today. Visual inspection finds dressing intact with no more than 25% coverage with dressing. Assessment:  Patient is progressing towards goals as previously established in POC with skilled home health physical therapy services at this time as made apparent by improving overall mobility including knee flexion range of motion. Family/caregiver daughter involvement is present/set-up at this point in time and assists with meals, transport, ADLs and transfers as needed, and general encouragement. No suspected post surgical infection at this time. Challenged by toe taps on steps today due to limited knee range of motion flexion. She was more easily capable of completing standing heel/toe raises today as part of given exercise program.     Plan:  Discharge planning discussed at this visit regarding eventual discharge once patient has met goals and/or met maximum benefit from home health skilled PT services with patient understanding and agreeable at this time. Continued need for skilled home health PT at this time to address deficits, reduce risk of falls, and obtain goals as previously established per plan of care. Further gait training around the home and attempt stair training.

## 2023-10-16 NOTE — HOME HEALTH
Subjective:  Patient states that she has been trying to ambulate around the home often as instructed. She denies any falls since last visit when asked. She states that she has noticed some drainage into the dressing and asks if it is normal.     Objective: Skilled home health physical therapy interventions completed today listed in care plan section. Interventions performed today to assist in return to prior level of function, address deficits as apparent upon time of initial evaluation, return to community and personal activities, and progress further towards goals as previously established in plan of care. There are not any changes to medications upon timing of this visit. Home health supplies were not ordered/delivered today. Trained in management of constipation including improving fluid intake and staying mobile. Visual inspection finds dressing intact over anterior knee incision with no more than 20% coverage by drainage with some ecchymosis noted medial border of dressing at knee joint line. Molly's sign negative. Assessment:  Patient is progressing towards goals as previously established in POC with skilled home health physical therapy services at this time as made apparent by ability to display improved bed mobility and transfer mobility after training for corrections today. She displayed verbal understanding of need for continued regular icing to manage symptoms and alternative methods of managing constipation reports. Family/caregiver daughter involvement is present/set-up at this point in time and assists with meals, transport, ADLs as needed, transfers as needed, and general encouragement. No suspected post surgical infection at this time. Was incapable of performing straight leg raise flexion today supine.      Plan:  Discharge planning discussed at this visit regarding eventual discharge once patient has met goals and/or met maximum benefit from home health skilled PT services with patient

## 2023-10-17 ENCOUNTER — HOME CARE VISIT (OUTPATIENT)
Age: 53
End: 2023-10-17
Payer: COMMERCIAL

## 2023-10-17 VITALS
DIASTOLIC BLOOD PRESSURE: 82 MMHG | SYSTOLIC BLOOD PRESSURE: 122 MMHG | OXYGEN SATURATION: 98 % | TEMPERATURE: 97.8 F | RESPIRATION RATE: 18 BRPM | HEART RATE: 70 BPM

## 2023-10-17 VITALS
SYSTOLIC BLOOD PRESSURE: 148 MMHG | OXYGEN SATURATION: 100 % | TEMPERATURE: 97.7 F | RESPIRATION RATE: 16 BRPM | DIASTOLIC BLOOD PRESSURE: 100 MMHG | HEART RATE: 77 BPM

## 2023-10-17 PROCEDURE — G0157 HHC PT ASSISTANT EA 15: HCPCS

## 2023-10-17 ASSESSMENT — ENCOUNTER SYMPTOMS: PAIN LOCATION - PAIN QUALITY: ACHING

## 2023-10-18 ENCOUNTER — HOME CARE VISIT (OUTPATIENT)
Age: 53
End: 2023-10-18
Payer: COMMERCIAL

## 2023-10-18 PROCEDURE — G0157 HHC PT ASSISTANT EA 15: HCPCS

## 2023-10-19 ENCOUNTER — HOME CARE VISIT (OUTPATIENT)
Age: 53
End: 2023-10-19
Payer: COMMERCIAL

## 2023-10-19 PROCEDURE — G0157 HHC PT ASSISTANT EA 15: HCPCS

## 2023-10-19 ASSESSMENT — ENCOUNTER SYMPTOMS: PAIN LOCATION - PAIN QUALITY: ACHY

## 2023-10-19 NOTE — HOME HEALTH
SUBJECTIVE: Patient c/o constant achy pain on L knee  with highest pain level of 8/10 and least pain level of 5/10 for the past 24 hours. Patient manages pain by  taking pain medication as prescribed by MD, application of cold pack on painful area x 20 mins, positioning and relaxation. Patient reported she last took 1 tab of Percocet at 9am.  Patient reported having bowel movement today. Chepe Lerner CAREGIVER INVOLVEMENT/ASSISTANCE NEEDED FOR: No cg present during this visit. Chepe Lerner HOME HEALTH SUPPLIES BY TYPE AND QUANTITY ORDERED/DELIVERED THIS VISIT INCLUDE: NA  .  MEDICATIONS RECONCILED AND UPDATED: no changes in medications at this time  . OBJECTIVE:  See interventions. Chepe Lerner PATIENT RESPONSE TO TREATMENT:  Noted improvement on bed mobility from moderate assistance in bed mobility for LE management and 50% verbal cues for proper techniques to touching to minimal assistance for LEs management without use of leg . Patient able to demonstrates HEP with good return demonstration. Patient denies increased pain during and post HEP ed. HEP includes: supine ankle pumps, ankle circles, knee pressdowns, hamstring set, heel slides, SLR, LAQs. Written instruction provided. Noted improvement on L knee ROM from 10-48 active-assisted L knee ROM with c/o 9/10 pain at end range  to 4-62 degrees with c/o 7/10 tightness pain at end range post gentle L knee ROM exercises. Chepe Lerner PATIENT LEVEL OF UNDERSTANDING OF EDUCATION PROVIDED: Patient verbalized understanding of  HEP, fall prevention ed, pain management and swelling management. .    ASSESSMENT OF PROGRESS TOWARD GOALS: Progressing towards goals as evidenced by increased level of indep in bed mobility and improvement on L knee ROM. Chepe Lerner CONTINUED NEED FOR THE FOLLOWING SKILLS: L LE weakness, LOM on L knee, pain on L knee, impaired balanace and decreased safety in functional mobility skills. Chepe Lerner   PLAN FOR NEXT VISIT: L LE strengthening, restoring L knee ROM, balance training and

## 2023-10-20 ENCOUNTER — HOME CARE VISIT (OUTPATIENT)
Age: 53
End: 2023-10-20
Payer: COMMERCIAL

## 2023-10-20 PROCEDURE — G0157 HHC PT ASSISTANT EA 15: HCPCS

## 2023-10-21 ENCOUNTER — HOME CARE VISIT (OUTPATIENT)
Age: 53
End: 2023-10-21
Payer: COMMERCIAL

## 2023-10-21 VITALS
OXYGEN SATURATION: 98 % | DIASTOLIC BLOOD PRESSURE: 82 MMHG | RESPIRATION RATE: 18 BRPM | HEART RATE: 76 BPM | SYSTOLIC BLOOD PRESSURE: 142 MMHG | HEART RATE: 72 BPM | DIASTOLIC BLOOD PRESSURE: 82 MMHG | TEMPERATURE: 98.2 F | TEMPERATURE: 98.2 F | RESPIRATION RATE: 18 BRPM | OXYGEN SATURATION: 98 % | SYSTOLIC BLOOD PRESSURE: 138 MMHG

## 2023-10-21 PROCEDURE — G0157 HHC PT ASSISTANT EA 15: HCPCS

## 2023-10-21 ASSESSMENT — ENCOUNTER SYMPTOMS
PAIN LOCATION - PAIN QUALITY: ACHING
PAIN LOCATION - PAIN QUALITY: ACHING

## 2023-10-22 ENCOUNTER — HOME CARE VISIT (OUTPATIENT)
Age: 53
End: 2023-10-22
Payer: COMMERCIAL

## 2023-10-22 PROCEDURE — G0157 HHC PT ASSISTANT EA 15: HCPCS

## 2023-10-23 ENCOUNTER — HOME CARE VISIT (OUTPATIENT)
Age: 53
End: 2023-10-23
Payer: COMMERCIAL

## 2023-10-23 VITALS
OXYGEN SATURATION: 99 % | OXYGEN SATURATION: 98 % | SYSTOLIC BLOOD PRESSURE: 122 MMHG | DIASTOLIC BLOOD PRESSURE: 78 MMHG | RESPIRATION RATE: 14 BRPM | RESPIRATION RATE: 14 BRPM | SYSTOLIC BLOOD PRESSURE: 122 MMHG | TEMPERATURE: 98.3 F | HEART RATE: 68 BPM | HEART RATE: 72 BPM | DIASTOLIC BLOOD PRESSURE: 84 MMHG | TEMPERATURE: 98.1 F

## 2023-10-23 VITALS
OXYGEN SATURATION: 99 % | DIASTOLIC BLOOD PRESSURE: 78 MMHG | SYSTOLIC BLOOD PRESSURE: 138 MMHG | RESPIRATION RATE: 18 BRPM | HEART RATE: 70 BPM | TEMPERATURE: 97.8 F

## 2023-10-23 PROCEDURE — G0151 HHCP-SERV OF PT,EA 15 MIN: HCPCS

## 2023-10-23 ASSESSMENT — ENCOUNTER SYMPTOMS
PAIN LOCATION - PAIN QUALITY: TIGHTNESS
PAIN LOCATION - PAIN QUALITY: SORENESS, TIGHTNESS
PAIN LOCATION - PAIN QUALITY: ACHING

## 2023-10-24 ENCOUNTER — HOME CARE VISIT (OUTPATIENT)
Age: 53
End: 2023-10-24
Payer: COMMERCIAL

## 2023-10-24 VITALS
TEMPERATURE: 97.9 F | DIASTOLIC BLOOD PRESSURE: 78 MMHG | OXYGEN SATURATION: 98 % | HEART RATE: 76 BPM | SYSTOLIC BLOOD PRESSURE: 134 MMHG | RESPIRATION RATE: 17 BRPM

## 2023-10-24 PROCEDURE — G0157 HHC PT ASSISTANT EA 15: HCPCS

## 2023-10-24 ASSESSMENT — ENCOUNTER SYMPTOMS: PAIN LOCATION - PAIN QUALITY: ACHING

## 2023-10-24 NOTE — HOME HEALTH
Subjective: Patient complains of feeling that she is able to improve upon her tightness and knee ROM, however the limitations return quickly when she rests and is frustrated by this. She relays that she has been able to complete her given HEP regularly as instructed and is even doing so to help manage her symptoms. Objective: Skilled home health physical therapy interventions completed today listed in care plan section. Interventions performed today to assist in return to prior level of function, address deficits as apparent upon time of initial evaluation, return to community and personal activities, and progress further towards goals as previously established in plan of care. There are not any changes to medications upon timing of this visit. Home health supplies were not ordered/delivered today. Visual inspection finds dressing intact over incision with minimal drainage noted into dressing. Assessment:  Patient is progressing towards goals as previously established in POC with skilled home health physical therapy services at this time as made apparent by ability to move to 82 degrees knee flexion AROM post interventions today without manual assistance needed and she reported decreased difficulty obtaining that range after manual techniques. There were significant tender spots at left sartorius, able to manage and reduce with SCS. Family/caregiver daughter and friend involvement are present/set-up at this point in time and assists with meals, transport, and general encouragement. Plan:  Discharge planning discussed at this visit regarding eventual discharge once patient has met goals and/or met maximum benefit from home health skilled PT services with patient understanding and agreeable at this time. Continued need for skilled home health PT at this time to address deficits, reduce risk of falls, and obtain goals as previously established per plan of care.  Stair training needed along with outside

## 2023-10-24 NOTE — HOME HEALTH
skilled home health PT at this time to address deficits, reduce risk of falls, and obtain goals as previously established per plan of care. Further gait training and maximize available knee flexion AROM as tolerated. Follow-up visit with surgeon's office later this week.

## 2023-10-25 ENCOUNTER — HOME CARE VISIT (OUTPATIENT)
Age: 53
End: 2023-10-25
Payer: COMMERCIAL

## 2023-10-25 PROCEDURE — G0157 HHC PT ASSISTANT EA 15: HCPCS

## 2023-10-25 NOTE — HOME HEALTH
SUBJECTIVE: Patient reports L knee at 6/10- no change from yesterday. Patient denies any recent falls or medication changes. CAREGIVER INVOLVEMENT/ASSISTANCE NEEDED FOR: Patient lives in a one story home with stairs present to enter/exit home - patient has the assistance of her daughters for meal preparations and ADLs as needed. OBJECTIVE:  See interventions. PATIENT RESPONSE TO TREATMENT: Patient is encouarged by increasing L knee ROM and reports relief following AAROM knee flexion exercises. PATIENT LEVEL OF UNDERSTANDING OF EDUCATION PROVIDED: Patient was instructed on importance of using AD at all times with standing to decrease risk for tripping/falls and to improve patient safety. Patient was instructed on importance of keeping L knee extended throughout the day to increase knee extension ROM. Patient was also educated on importance of elevating LLE above heart level to decrease edema while keeping knee in an extended position for at least 30-45 minutes, 3-5x per day if able. ASSESSMENT OF PROGRESS TOWARD GOALS: Patient was seen today for a PT follow up and patients L knee AAROM is 0-85 degrees following stretching and therex tasks which is a great improvement from last visit. Patient was able to perform gait in home with education to increase knee extension during stance phase of gait- she was fatiguef following 140' secondary to limited space present in home with much manuvering around furniture present. Patient is aware of weekend PTA visits and is agreeable. [address] was educated to take a walk at least once per hour to increase time spent in standing. Patient was also educated on supine: quad sets for 5 second holds, heel slides, ankle pumps, in sitting: LAQ, knee flexion, hip marching, hip abduction with LE extended all x10 reps each, 3-5x per day as able.     THE FOLLOWING DISCHARGE PLANNING WAS DISCUSSED WITH THE PATIENT/CAREGIVER: LAW from [facility] PT once goals are

## 2023-10-26 ENCOUNTER — HOME CARE VISIT (OUTPATIENT)
Age: 53
End: 2023-10-26
Payer: COMMERCIAL

## 2023-10-26 VITALS
TEMPERATURE: 98.2 F | RESPIRATION RATE: 17 BRPM | HEART RATE: 68 BPM | OXYGEN SATURATION: 98 % | SYSTOLIC BLOOD PRESSURE: 128 MMHG | DIASTOLIC BLOOD PRESSURE: 78 MMHG

## 2023-10-26 PROCEDURE — G0157 HHC PT ASSISTANT EA 15: HCPCS

## 2023-10-26 ASSESSMENT — ENCOUNTER SYMPTOMS
PAIN LOCATION - PAIN QUALITY: ACHING
PAIN LOCATION - PAIN QUALITY: ACHING

## 2023-10-27 ENCOUNTER — OFFICE VISIT (OUTPATIENT)
Age: 53
End: 2023-10-27
Payer: COMMERCIAL

## 2023-10-27 VITALS
OXYGEN SATURATION: 97 % | RESPIRATION RATE: 16 BRPM | HEART RATE: 69 BPM | DIASTOLIC BLOOD PRESSURE: 78 MMHG | TEMPERATURE: 97.9 F | SYSTOLIC BLOOD PRESSURE: 136 MMHG

## 2023-10-27 VITALS
RESPIRATION RATE: 14 BRPM | OXYGEN SATURATION: 99 % | HEART RATE: 77 BPM | SYSTOLIC BLOOD PRESSURE: 144 MMHG | DIASTOLIC BLOOD PRESSURE: 92 MMHG | TEMPERATURE: 97.6 F

## 2023-10-27 VITALS — HEIGHT: 71 IN | BODY MASS INDEX: 39.06 KG/M2 | WEIGHT: 279 LBS

## 2023-10-27 DIAGNOSIS — Z48.02 ENCOUNTER FOR STAPLE REMOVAL: ICD-10-CM

## 2023-10-27 DIAGNOSIS — Z96.652 PRESENCE OF LEFT ARTIFICIAL KNEE JOINT: Primary | ICD-10-CM

## 2023-10-27 DIAGNOSIS — M17.11 PRIMARY OSTEOARTHRITIS OF RIGHT KNEE: ICD-10-CM

## 2023-10-27 PROCEDURE — 99213 OFFICE O/P EST LOW 20 MIN: CPT | Performed by: PHYSICIAN ASSISTANT

## 2023-10-27 PROCEDURE — 99024 POSTOP FOLLOW-UP VISIT: CPT | Performed by: PHYSICIAN ASSISTANT

## 2023-10-27 RX ORDER — BETAMETHASONE SODIUM PHOSPHATE AND BETAMETHASONE ACETATE 3; 3 MG/ML; MG/ML
6 INJECTION, SUSPENSION INTRA-ARTICULAR; INTRALESIONAL; INTRAMUSCULAR; SOFT TISSUE ONCE
Status: COMPLETED | OUTPATIENT
Start: 2023-10-27 | End: 2023-10-27

## 2023-10-27 RX ADMIN — BETAMETHASONE SODIUM PHOSPHATE AND BETAMETHASONE ACETATE 6 MG: 3; 3 INJECTION, SUSPENSION INTRA-ARTICULAR; INTRALESIONAL; INTRAMUSCULAR; SOFT TISSUE at 09:58

## 2023-10-27 ASSESSMENT — ENCOUNTER SYMPTOMS: PAIN LOCATION - PAIN QUALITY: ACHY

## 2023-10-27 NOTE — HOME HEALTH
reps each, 3-5x per day as able. THE FOLLOWING DISCHARGE PLANNING WAS DISCUSSED WITH THE PATIENT/CAREGIVER: DC from [facility] PT once goals are met. PLAN NEXT VISIT: Plan in coming visit to take ROM measure.

## 2023-10-27 NOTE — HOME HEALTH
SUBJECTIVE: Patient reports L knee at 5/10. Patient reports \"feeling a little better\". Patient denies any recent falls or medication changes. CAREGIVER INVOLVEMENT/ASSISTANCE NEEDED FOR: Patient lives in a one story home with stairs present to enter/exit home - patient has the assistance of her daughters for meal preparations and ADLs as needed. OBJECTIVE:  See interventions. PATIENT RESPONSE TO TREATMENT: Patient is encouarged by increasing L knee ROM and reports relief following AAROM knee flexion exercises. PATIENT LEVEL OF UNDERSTANDING OF EDUCATION PROVIDED: Patient was instructed on importance of using AD at all times with standing to decrease risk for tripping/falls and to improve patient safety. Patient was instructed on importance of keeping L knee extended throughout the day to increase knee extension ROM. Patient was also educated on importance of elevating LLE above heart level to decrease edema while keeping knee in an extended position for at least 30-45 minutes, 3-5x per day if able. ASSESSMENT OF PROGRESS TOWARD GOALS: Patient was seen today for a PT follow up and patients L knee AAROM is 0-85 degrees - no change from previous measure. Patient was also able to perform gait in home with cues to increase knee extension and heel strike. Patient also requires BUE support and SBA for safety with 15% incination noted- she has much challenge with this task. Reviewed proper positioning with patient and added SLR- she requires tactile cues to maintain proper quad control with task. [address] was educated to take a walk at least once per hour to increase time spent in standing. Patient was also educated on supine: quad sets for 5 second holds, heel slides, ankle pumps, in sitting: LAQ, knee flexion, hip marching, hip abduction with LE extended all x10 reps each, 3-5x per day as able.     THE FOLLOWING DISCHARGE PLANNING WAS DISCUSSED WITH THE PATIENT/CAREGIVER: LAW from

## 2023-10-27 NOTE — PROGRESS NOTES
therapy. Regarding the right knee we will move with a cortisone injection for the right knee today. After informed consent, under aseptic conditions, with US guided assitance, the right knee was prepped with betadine and a mxiture of 3ml 1% lidocaine and 6mg of celestone was injected without complications. The patient tolerated the injection well. The patient is instructed on post-injection care. We will see her back in 2 weeks time for reevaluation and range of motion check for her left knee. Care plan outlined and precautions discussed. Radiographs and Results were reviewed with the patient. Medications were reviewed with the patient. All of pt's questions and concerns were addressed. Increasing symptoms and return precautions associated with chief complaint and evaluation were reviewed with the patient in detail. The patient demonstrated adequate understanding. Chart reviewed for the following:  Paolo BLAIR PA-C, have reviewed the History, Physical and updated the Allergic reactions for James Landry?     TIME OUT performed immediately prior to start of procedure:  Paolo BLAIR PA-C, have performed the following reviews on James Landry prior to the start of the procedure:  ????????  * Patient was identified by name and date of birth   * Agreement on procedure being performed was verified  * Risks and Benefits explained to the patient  * Procedure site verified and marked as necessary  * Patient was positioned for comfort  * Consent was signed and verified    Time:9:54 AM    Body part: right knee, intra-articular    Medication & Dose: 3ml 1% lidocaine and 6mg celestone    Date of procedure: 10/27/23    Procedure performed by: Mona Nj PA-C    Provider assisted by: none    Patient assisted by: self    How tolerated by patient: tolerated the procedure well with no complications    Post Procedural Pain Scale: 5    Comments:   [address] using a frequency of 10MHz

## 2023-10-27 NOTE — HOME HEALTH
SUBJECTIVE: Patient reports L knee at 6/10. Patient denies any recent falls or medication changes. CAREGIVER INVOLVEMENT/ASSISTANCE NEEDED FOR: Patient lives in a one story home with stairs present to enter/exit home - patient has the assistance of her daughters for meal preparations and ADLs as needed. OBJECTIVE:  See interventions. PATIENT RESPONSE TO TREATMENT: Patient was motivated and encouraged by her increase in ROM - encouraged patient to pace herself. No increase in pain is presenttoday. PATIENT LEVEL OF UNDERSTANDING OF EDUCATION PROVIDED: Patient was instructed on importance of using AD at all times with standing to decrease risk for tripping/falls and to improve patient safety. Patient was instructed on importance of keeping L knee extended throughout the day to increase knee extension ROM. Patient was also educated on importance of elevating LLE above heart level to decrease edema while keeping knee in an extended position for at least 30-45 minutes, 3-5x per day if able. ASSESSMENT OF PROGRESS TOWARD GOALS: Patient was seen today for a PT follow up and patient was able to perform supine LLE therex with following AAROM measure of L knee being 0-90 degrees. Patient also performed SLR but does require education and tactile cueing to perform quad set to control knee with hip flexion- she reports much challenge with this task. Patient is now (I) with all aspects of bed mobility but still does require assistance to elevate LEs in the bed. Patient is on track for DC in coming visit to [facility] or possible transition to outpatient following upcoming appt. [address] was educated to take a walk at least once per hour to increase time spent in standing. Patient was also educated on supine: quad sets for 5 second holds, heel slides, ankle pumps, in sitting: LAQ, knee flexion, hip marching, hip abduction with LE extended all x10 reps each, 3-5x per day as able.     THE

## 2023-10-28 ENCOUNTER — HOME CARE VISIT (OUTPATIENT)
Age: 53
End: 2023-10-28
Payer: COMMERCIAL

## 2023-10-28 PROCEDURE — G0151 HHCP-SERV OF PT,EA 15 MIN: HCPCS

## 2023-10-28 NOTE — HOME HEALTH
During reassessment, pt. presents with the following clinical findings:   . SUBJECTIVE: Patient denies fall since evaluation. Patient reported she can now sometimes get in and out of the bed without leg . Patient c/o constant achy pain on L knee  with highest pain level of 7/10 and least pain level of 5/10 for the past 24 hours. Patient manages pain by taking pain medication as prescribed by MD, application of cold pack on painful area x 20 mins, positioning and relaxation. Patient reported pain is worse at bedtime. Patient reported she last took 1 tab of oxycodone at 9am.  . CAREGIVER ASSISTANCE: No cg present during this visit. Angelique Flores MEDICATIONS RECONCILED AND UPDATED: no changes in medications at this time. .  WOUND: Unobserved surgical incision on L knee with non-removable dressing. No signs or sx of infection and skin around bandage is in good condition. Patient verbalized understanding of all signs and sx of infection and when to call MD.  .  MMT: R LE WFL  L hip flex 3-/5  L hip Abd 3+/5  L hip add 3+/5  L knee flex 3-/5  L knee ext. 3-/5  L ankle DF 3+/5  FTSTS: unable. requires joie UE assistance to safely complete sit to stand. SLR: able to perform with minimal difficulty. compared to R LE WFL  L hip flex 2+/5  L hip Abd 2+/5  L hip add 2+/5  L knee flex 2+/5  L knee ext. 2+/5  L ankle DF 3/5   FTSTS: unable during initial evaluation. ROM: 0-80 degrees R knee ROM with c/o 8/10 at end range of flexion compared to 10-48 active-assisted L knee ROM with c/o 9/10 pain at end range during initial evaluation. BALANCE: 19/28 on Tinetti balance and gait test compared to 10/28 during initial evaluation. .  BED MOBILITY: supervision/touching assistance compared to moderate assistance in bed mobility for LE management and 50% verbal cues for proper techniques during initial evaluation.   TRANSFERS: indep  to and from bed, chair and toilet using FWW compared to minimal to mod assistance in safe transfers

## 2023-10-29 NOTE — HOME HEALTH
During this visit, patient presents with the following clinical findings:    SUBJECTIVE: Patient denies fall since evaluation. Patient c/o constant achy pain on L knee  with highest pain level of 7/10 and least pain level of 4/10 for the past 24 hours. Patient manages pain by taking pain medication as prescribed by MD, application of cold pack on painful area x 20 mins, positioning and relaxation. Patient reported she is waiting for outpatient therapy call back for her evaluation date. -  CAREGIVER ASSISTANCE: No cg present during this visit. MEDICATIONS RECONCILED AND UPDATED: no changes in medications at this time. MMT: R LE WFL  L hip flex 3+/5  L hip Abd 4-/5  L hip add 4-/5  L knee flex 3+/5  L knee ext. 3+/5  L ankle DF 4/5    FTSTS: unable. requires joie UE assistance to safely complete sit to stand. SLR: able to perform with minimal difficulty. compared to L hip flex 3-/5  L hip Abd 3+/5  L hip add 3+/5  L knee flex 3-/5  L knee ext. 3-/5  L ankle DF 3+/5  FTSTS: unable. requires joie UE assistance to safely complete sit to stand. SLR: able to perform with minimal difficulty. during reassessment visit. ROM: noted 0-83 degrees on L knee with c/o 7/10 burning pain at end range of flexion compared to 0-80 degrees R knee ROM with c/o 8/10 at end range of flexion  during reassessment visit. BALANCE: Patient is moderate risk of falls evidenced by  24/28 on Tinetti balance and gait test  compared to 19/28. BED MOBILITY: indep in bed mobility without cues from supervision/touching assistance during reassessment visit. TRANSFERS: indep in transfers to and from bed, chair, toilet, and car using FWW without. GAIT: Patient is now able to ambulate 500+ft  indep  on even and uneven surfaces using FWW with decreased hong and intermittent decreased stance on L Compared to indep in household ambulation using FWW and able to ambulate 1000+ft outdoors using FWW with supervision assistance.  Patient

## 2023-10-30 ENCOUNTER — TELEPHONE (OUTPATIENT)
Age: 53
End: 2023-10-30

## 2023-10-30 NOTE — TELEPHONE ENCOUNTER
Patient is requesting to go to [facility] to do her PT, and is asking for order to be faxed. Patient can be reached at [phone].

## 2023-11-01 VITALS
DIASTOLIC BLOOD PRESSURE: 88 MMHG | TEMPERATURE: 98.2 F | SYSTOLIC BLOOD PRESSURE: 134 MMHG | OXYGEN SATURATION: 98 % | RESPIRATION RATE: 16 BRPM | HEART RATE: 86 BPM

## 2023-11-01 ASSESSMENT — ENCOUNTER SYMPTOMS: PAIN LOCATION - PAIN QUALITY: ACHY

## 2023-11-02 ENCOUNTER — TELEPHONE (OUTPATIENT)
Age: 53
End: 2023-11-02

## 2023-11-02 DIAGNOSIS — G89.18 POST-OP PAIN: ICD-10-CM

## 2023-11-02 DIAGNOSIS — Z96.652 PRESENCE OF LEFT ARTIFICIAL KNEE JOINT: Primary | ICD-10-CM

## 2023-11-02 NOTE — TELEPHONE ENCOUNTER
Patient is asking for a rx refill of Oxycodone to be sent to [facility] on  [address].    Patient is also asking if she should be putting anything on her incision now that the steri-strips are starting to come off.     Patient can be reached at [phone].

## 2023-11-03 RX ORDER — OXYCODONE HYDROCHLORIDE AND ACETAMINOPHEN 5; 325 MG/1; MG/1
1 TABLET ORAL EVERY 6 HOURS PRN
Qty: 28 TABLET | Refills: 0 | Status: SHIPPED | OUTPATIENT
Start: 2023-11-03 | End: 2023-11-10

## 2023-11-10 ENCOUNTER — OFFICE VISIT (OUTPATIENT)
Age: 53
End: 2023-11-10

## 2023-11-10 VITALS — WEIGHT: 279 LBS | HEIGHT: 71 IN | BODY MASS INDEX: 39.06 KG/M2

## 2023-11-10 DIAGNOSIS — Z96.652 PRESENCE OF LEFT ARTIFICIAL KNEE JOINT: Primary | ICD-10-CM

## 2023-11-10 DIAGNOSIS — G89.18 POST-OP PAIN: ICD-10-CM

## 2023-11-10 DIAGNOSIS — M17.11 PRIMARY OSTEOARTHRITIS OF RIGHT KNEE: ICD-10-CM

## 2023-11-10 PROCEDURE — 99024 POSTOP FOLLOW-UP VISIT: CPT | Performed by: PHYSICIAN ASSISTANT

## 2023-11-10 NOTE — PROGRESS NOTES
return precautions associated with chief complaint and evaluation were reviewed with the patient in detail. The patient demonstrated adequate understanding.                 JR Kin HECK PA-C, ATC

## 2023-11-20 ENCOUNTER — TELEPHONE (OUTPATIENT)
Age: 53
End: 2023-11-20

## 2023-11-20 DIAGNOSIS — G89.18 POST-OP PAIN: Primary | ICD-10-CM

## 2023-11-21 RX ORDER — HYDROCODONE BITARTRATE AND ACETAMINOPHEN 7.5; 325 MG/1; MG/1
1-2 TABLET ORAL EVERY 8 HOURS PRN
Qty: 42 TABLET | Refills: 0 | Status: SHIPPED | OUTPATIENT
Start: 2023-11-21 | End: 2023-11-28

## 2023-11-22 ENCOUNTER — TELEPHONE (OUTPATIENT)
Age: 53
End: 2023-11-22

## 2023-11-22 NOTE — TELEPHONE ENCOUNTER
Patient needs a prior-auth for her medication Hydrocodone.    She is requesting her medication before the holiday if possible.    Pharmacy:  [id number] - [place], [place] - [address] - P [phone] - F [phone]

## 2023-11-22 NOTE — TELEPHONE ENCOUNTER
Patient called and stated that she spoke with her insurance ([employer]) and they advised her that the form can't be submitted online, it has to be filled out and faxed over to [phone].

## 2023-11-29 ENCOUNTER — OFFICE VISIT (OUTPATIENT)
Age: 53
End: 2023-11-29
Payer: COMMERCIAL

## 2023-11-29 VITALS — WEIGHT: 279 LBS | HEIGHT: 71 IN | BODY MASS INDEX: 39.06 KG/M2

## 2023-11-29 DIAGNOSIS — G89.18 POST-OP PAIN: Primary | ICD-10-CM

## 2023-11-29 DIAGNOSIS — Z96.652 PRESENCE OF LEFT ARTIFICIAL KNEE JOINT: ICD-10-CM

## 2023-11-29 PROCEDURE — 99024 POSTOP FOLLOW-UP VISIT: CPT | Performed by: PHYSICIAN ASSISTANT

## 2023-11-29 PROCEDURE — 73562 X-RAY EXAM OF KNEE 3: CPT | Performed by: PHYSICIAN ASSISTANT

## 2023-12-05 ENCOUNTER — TELEPHONE (OUTPATIENT)
Age: 53
End: 2023-12-05

## 2023-12-05 DIAGNOSIS — R32 URINARY INCONTINENCE, UNSPECIFIED TYPE: Primary | ICD-10-CM

## 2023-12-05 NOTE — TELEPHONE ENCOUNTER
Patient report bladder incontinence after knee surgery on 10/11. She admits not bringing this up at post-op visits however is now concerned because it is still happening. She is taken all post-op medication as prescribe with no changes since surgery. Please contact patient to discuss [phone].

## 2023-12-18 ENCOUNTER — TELEPHONE (OUTPATIENT)
Age: 53
End: 2023-12-18

## 2023-12-18 DIAGNOSIS — G89.18 POST-OP PAIN: Primary | ICD-10-CM

## 2023-12-18 DIAGNOSIS — Z96.652 PRESENCE OF LEFT ARTIFICIAL KNEE JOINT: ICD-10-CM

## 2023-12-18 RX ORDER — HYDROCODONE BITARTRATE AND ACETAMINOPHEN 5; 325 MG/1; MG/1
1 TABLET ORAL EVERY 8 HOURS PRN
Qty: 21 TABLET | Refills: 0 | Status: SHIPPED | OUTPATIENT
Start: 2023-12-18 | End: 2023-12-25

## 2023-12-18 NOTE — TELEPHONE ENCOUNTER
Patient is requesting a refill on         HYDROcodone-acetaminophen (NORCO) 7.5-325 MG per tablet           [facility]  [address] [address]   Phone: [phone]  Fax: [phone]         Please call and advise patient at   [phone]

## 2024-01-02 ENCOUNTER — OFFICE VISIT (OUTPATIENT)
Age: 54
End: 2024-01-02

## 2024-01-02 VITALS — BODY MASS INDEX: 38.91 KG/M2 | HEIGHT: 71 IN

## 2024-01-02 DIAGNOSIS — M17.11 PRIMARY OSTEOARTHRITIS OF RIGHT KNEE: ICD-10-CM

## 2024-01-02 DIAGNOSIS — L91.0 POSTOPERATIVE KELOID SCAR: ICD-10-CM

## 2024-01-02 DIAGNOSIS — M24.662 FIBROSIS OF LEFT KNEE JOINT: ICD-10-CM

## 2024-01-02 DIAGNOSIS — Z48.89 ENCOUNTER FOR POSTOPERATIVE CARE: ICD-10-CM

## 2024-01-02 DIAGNOSIS — Z96.652 PRESENCE OF LEFT ARTIFICIAL KNEE JOINT: Primary | ICD-10-CM

## 2024-01-02 DIAGNOSIS — Z98.890 POSTOPERATIVE KELOID SCAR: ICD-10-CM

## 2024-01-02 PROCEDURE — 99024 POSTOP FOLLOW-UP VISIT: CPT | Performed by: PHYSICIAN ASSISTANT

## 2024-01-02 NOTE — PROGRESS NOTES
morning.  Some dull achiness at times.    Patient denies recent fevers, chills, chest pain, SOB, or injuries.   No recent systemic changes noted.  A 12-point review of systems is performed today.  Pertinent positives are noted.  All other systems reviewed and otherwise are negative.    Physical exam: General: Alert and oriented x3, nad.  well-developed, well nourished.  normal affect, AF.  NC/AT, EOMI, neck supple, trachea midline, no JVD present. Breathing is non-labored.  During ambulation she displays no start up pain.  Examination of the left knee reveals skin intact.  The surgical wound is healed nicely.  There is no erythema or ecchymosis noted.  There are no signs for infection or cellulitis present.  Range of motion is full.  No extensor lag.  No defects noted in the extensor mechanism.  Stability is quite good.  Patella tracks nicely.  Does have a little bit of a rub in the suprapatellar area.  Negative calf tenderness.  Negative Homans.  No signs of DVT present.    Assessment: Status post left total knee replacement    Plan: At this point, the patient is done very well with her knee replacement.  She will continue with home exercise program and instructed on range of motion and strengthening activities.  We will see her back for preoperative history and physical for her upcoming right total knee replacement.  She will continue on Celebrex.    Care plan outlined and precautions discussed. Radiographs and Results were reviewed with the patient.  Medications were reviewed with the patient. All of pt's questions and concerns were addressed.  Increasing symptoms and return precautions associated with chief complaint and evaluation were reviewed with the patient in detail.  The patient demonstrated adequate understanding.                JR Kin HECK, JOSEE, ATC

## 2024-01-15 DIAGNOSIS — Z01.818 PRE-OP TESTING: Primary | ICD-10-CM

## 2024-01-15 DIAGNOSIS — M17.11 PRIMARY OSTEOARTHRITIS OF RIGHT KNEE: ICD-10-CM

## 2024-01-18 NOTE — PROGRESS NOTES
Instructions for your surgery at [facility]      Today's Date:  1/18/2024      Patient's Name:  Aleida Magdaleno           Surgery Date:  02/12/2024              Please enter the main entrance of the hospital and check-in at the  located in the lobby. Once checked in at the , you will take the elevators to the second floor, and report to the waiting room on the left. The room will say Procedure Registration.    Do NOT eat or drink anything, including candy, gum, or ice chips after midnight prior to your surgery, unless you have specific instructions from your surgeon or anesthesia provider to do so.  Brush your teeth before coming to the hospital. You may swish with water, but do not swallow.  No smoking/Vaping/E-Cigarettes 24 hours prior to the day of surgery.  No alcohol 24 hours prior to the day of surgery.  No recreational drugs for one week prior to the day of surgery.  Bring Photo ID, Insurance information, and Co-pay if required on day of surgery.  Bring in pertinent legal documents, such as, Medical Power of , DNR, Advance Directive, etc.  Leave all valuables, including money/purse, at home.  Remove all jewelry, including ALL body piercings, nail polish, acrylic nails, and makeup (including mascara); no lotions, powders, deodorant, or perfume/cologne/after shave on the skin.  Follow instruction for Hibiclens washes and CHG wipes from surgeon's office.   Glasses and dentures may be worn to the hospital. They must be removed prior to surgery. Please bring case/container for glasses or dentures.   Contact lenses should not be worn on day of surgery.   Call your doctor's office if symptoms of a cold or illness develop within 24-48 hours prior to your surgery.  Call your doctor's office if you have any questions concerning insurance or co-pays.  15. AN ADULT (relative or friend 18 years or older) MUST DRIVE YOU HOME AFTER YOUR SURGERY.  16. Please make

## 2024-01-19 ENCOUNTER — CLINICAL DOCUMENTATION (OUTPATIENT)
Age: 54
End: 2024-01-19

## 2024-01-19 NOTE — PROGRESS NOTES
Patient Patient dropped off FMLA forms at HS office to be completed pt can be reached at [phone]

## 2024-01-22 ENCOUNTER — CLINICAL DOCUMENTATION (OUTPATIENT)
Age: 54
End: 2024-01-22

## 2024-01-22 DIAGNOSIS — M17.11 PRIMARY OSTEOARTHRITIS OF RIGHT KNEE: ICD-10-CM

## 2024-01-22 DIAGNOSIS — Z01.818 PRE-OP TESTING: ICD-10-CM

## 2024-01-29 ENCOUNTER — NURSE ONLY (OUTPATIENT)
Age: 54
End: 2024-01-29
Payer: COMMERCIAL

## 2024-01-29 ENCOUNTER — HOSPITAL ENCOUNTER (OUTPATIENT)
Facility: HOSPITAL | Age: 54
Discharge: HOME | End: 2024-02-01

## 2024-01-29 DIAGNOSIS — M17.11 PRIMARY OSTEOARTHRITIS OF RIGHT KNEE: Primary | ICD-10-CM

## 2024-01-29 LAB
ALBUMIN SERPL-MCNC: 4.1 G/DL (ref 3.5–5)
ANION GAP SERPL CALCULATED.3IONS-SCNC: 9 MMOL/L (ref 3–15)
APTT: 26 SEC (ref 22–36)
BACTERIA: NEGATIVE
BASOPHILS # BLD: 1 % (ref 0–2)
BASOPHILS ABSOLUTE: 0 K/UL (ref 0–0.2)
BILIRUB SERPL-MCNC: NEGATIVE MG/DL
BLOOD: NEGATIVE
BUN BLDV-MCNC: 7 MG/DL (ref 6–22)
CALCIUM SERPL-MCNC: 9.4 MG/DL (ref 8.4–10.5)
CHLORIDE BLD-SCNC: 103 MMOL/L (ref 98–110)
CLARITY: CLEAR
CO2: 25 MMOL/L (ref 20–32)
COLOR: YELLOW
CREAT SERPL-MCNC: 0.6 MG/DL (ref 0.5–1.2)
EOSINOPHIL # BLD: 5 % (ref 0–6)
EOSINOPHILS ABSOLUTE: 0.2 K/UL (ref 0–0.5)
EPITHELIAL CELLS: ABNORMAL /HPF
ESTIMATED AVERAGE GLUCOSE: 121 MG/DL (ref 91–123)
GLOMERULAR FILTRATION RATE: >60 ML/MIN/1.73 SQ.M.
GLUCOSE: 80 MG/DL (ref 70–99)
GLUCOSE: NEGATIVE MG/DL
HBA1C MFR BLD: 5.8 % (ref 4.8–5.6)
HCT VFR BLD CALC: 38.5 % (ref 35.1–48)
HEMOGLOBIN: 11.8 G/DL (ref 11.7–16)
HYALINE CASTS: ABNORMAL /LPF (ref 0–2)
INR BLD: 0.98 (ref 0.89–1.29)
KETONES, URINE: ABNORMAL MG/DL
LEUKOCYTE ESTERASE, URINE: NEGATIVE
LYMPHOCYTES # BLD: 41 % (ref 20–45)
LYMPHOCYTES ABSOLUTE: 1.5 K/UL (ref 1–4.8)
MCH RBC QN AUTO: 27 PG (ref 26–34)
MCHC RBC AUTO-ENTMCNC: 31 G/DL (ref 31–36)
MCV RBC AUTO: 87 FL (ref 80–99)
MONOCYTES ABSOLUTE: 0.2 K/UL (ref 0.1–1)
MONOCYTES: 5 % (ref 3–12)
NEUTROPHILS ABSOLUTE: 1.7 K/UL (ref 1.8–7.7)
NEUTROPHILS: 48 % (ref 40–75)
NITRITE, URINE: NEGATIVE
PDW BLD-RTO: 13.4 % (ref 10–15.5)
PH, URINE: 5.5 PH (ref 5–8)
PLATELET # BLD: 221 K/UL (ref 140–440)
PMV BLD AUTO: 12.9 FL (ref 9–13)
POTASSIUM SERPL-SCNC: 4.9 MMOL/L (ref 3.5–5.5)
PROTEIN UA: NEGATIVE MG/DL
PROTHROMBIN TIME: 10.9 SEC (ref 9–13)
RBC URINE: ABNORMAL /HPF
RBC: 4.41 M/UL (ref 3.8–5.2)
RESULT: NORMAL
SENTARA SPECIMEN COLLECTION: NORMAL
SODIUM BLD-SCNC: 137 MMOL/L (ref 133–145)
SPECIFIC GRAVITY: 1.03 (ref 1–1.03)
UROBILINOGEN: 0.2 MG/DL
WBC UA: ABNORMAL /HPF (ref 0–5)
WBC: 3.5 K/UL (ref 4–11)

## 2024-01-29 PROCEDURE — 73564 X-RAY EXAM KNEE 4 OR MORE: CPT | Performed by: PHYSICIAN ASSISTANT

## 2024-01-31 ENCOUNTER — OFFICE VISIT (OUTPATIENT)
Age: 54
End: 2024-01-31

## 2024-01-31 VITALS — HEIGHT: 72 IN | WEIGHT: 293 LBS | BODY MASS INDEX: 39.68 KG/M2 | HEART RATE: 72 BPM | TEMPERATURE: 97.2 F

## 2024-01-31 DIAGNOSIS — M17.11 PRIMARY OSTEOARTHRITIS OF RIGHT KNEE: ICD-10-CM

## 2024-01-31 DIAGNOSIS — Z01.818 PRE-OP EXAM: Primary | ICD-10-CM

## 2024-01-31 RX ORDER — PREGABALIN 25 MG/1
75 CAPSULE ORAL 2 TIMES DAILY
OUTPATIENT
Start: 2024-01-31

## 2024-01-31 RX ORDER — DEXAMETHASONE SODIUM PHOSPHATE 4 MG/ML
8 INJECTION, SOLUTION INTRA-ARTICULAR; INTRALESIONAL; INTRAMUSCULAR; INTRAVENOUS; SOFT TISSUE
OUTPATIENT
Start: 2024-01-31 | End: 2024-01-31

## 2024-01-31 RX ORDER — TAMSULOSIN HYDROCHLORIDE 0.4 MG/1
0.4 CAPSULE ORAL DAILY
OUTPATIENT
Start: 2024-01-31

## 2024-01-31 RX ORDER — ACETAMINOPHEN 325 MG/1
1000 TABLET ORAL
OUTPATIENT
Start: 2024-01-31 | End: 2024-01-31

## 2024-01-31 NOTE — PATIENT INSTRUCTIONS
Patient: Aleida Magdaleno                MRN: [id number]       SSN:   YOB: 1970        AGE: 53 y.o.        SEX: female  Body mass index is 39.05 kg/m².    01/31/24    DO:  1:  Sit with the leg out straight 5-10 min every hour while awake.  Keep the toes pointed  up towards the maegan.             2.  Bend your knee 5-10 min every hour.  Bend it to the point of pain and hold it for 5-10 Min.            3.  ICE your knee 20 min every hour    4.  You can expect to have swelling and discomfort in your thigh down to your knee.  Swelling may extend to your foot.  You may have bruising from the thigh to the foot as well.  Some numbness can be expected to the outside part of the knee.  Wear the compression stockings and elevate your leg.      DO NOT:    1.  Do not place anything under your knee to prop it up  2.  Do not sit in the recliner chair.

## 2024-01-31 NOTE — H&P
HISTORY & PHYSICAL      Patient: Aleida Magdaleno                MRN: [id number]       SSN: [id number]  YOB: 1970        AGE: 53 y.o.        SEX: female  Body mass index is 39.05 kg/m².    PCP: Mimi Huffman PA  01/31/24      CC: right knee end stage OA  Problem List Items Addressed This Visit    None  Visit Diagnoses       Pre-op exam    -  Primary    Primary osteoarthritis of right knee                  HPI:  The patient is a pleasant 53 y.o. whom has end stage OA of their right knee and has failed conservative treatment including but not limited to NSAIDS, cortisone injections, viscosupplementation, PT, and pain medicine.  Due to the current findings and affected activities of daily living, surgical intervention is indicated.  The alternatives, risks, complications, as well as expected outcome were discussed.  These include but are not limited to infection, blood loss, need for blood transfusion, neurovascular damage, DVT, PE,  post-op stiffness and pain, leg length discrepancy, dislocation, anesthetic complications, prothesis longevity, need for more surgery, MI, stroke, and even death.  The patient understands and wishes to proceed with surgery.      Past Medical History:   Diagnosis Date    DVT (deep venous thrombosis) (HCC) 2003    History of pulmonary embolus (PE) 12/2017    bilateral    Hx of blood clots          Current Outpatient Medications:     celecoxib (CELEBREX) 200 MG capsule, Take 1 capsule by mouth 2 times daily, Disp: 60 capsule, Rfl: 2    Ferrous Sulfate (IRON PO), Take by mouth daily, Disp: , Rfl:     chlorhexidine (PERIDEX) 0.12 % solution, RINSE MOUTH WITH 15ML (1 CAPFUL) FOR 30 SECONDS IN MORNING AND EVENING AFTER MEALS, THEN SPIT, Disp: , Rfl:     BD PEN NEEDLE RODNEY 2ND GEN 32G X 4 MM MISC, USE ONCE DAILY WITH VICTOZA, Disp: , Rfl:     lidocaine (LIDODERM) 5 %, APPLY 1 PATCH AND LEAVE IN PLACE FOR 12 HOURS, THEN REMOVE AND LEAVE OFF FOR 12 HOURS, Disp: , Rfl:

## 2024-01-31 NOTE — H&P (VIEW-ONLY)
HISTORY & PHYSICAL      Patient: Aleida Magdaleno                MRN: [id number]       SSN: [id number]  YOB: 1970        AGE: 53 y.o.        SEX: female  Body mass index is 39.05 kg/m².    PCP: Mimi Huffman PA  01/31/24      CC: right knee end stage OA  Problem List Items Addressed This Visit    None  Visit Diagnoses       Pre-op exam    -  Primary    Primary osteoarthritis of right knee                  HPI:  The patient is a pleasant 53 y.o. whom has end stage OA of their right knee and has failed conservative treatment including but not limited to NSAIDS, cortisone injections, viscosupplementation, PT, and pain medicine.  Due to the current findings and affected activities of daily living, surgical intervention is indicated.  The alternatives, risks, complications, as well as expected outcome were discussed.  These include but are not limited to infection, blood loss, need for blood transfusion, neurovascular damage, DVT, PE,  post-op stiffness and pain, leg length discrepancy, dislocation, anesthetic complications, prothesis longevity, need for more surgery, MI, stroke, and even death.  The patient understands and wishes to proceed with surgery.      Past Medical History:   Diagnosis Date    DVT (deep venous thrombosis) (HCC) 2003    History of pulmonary embolus (PE) 12/2017    bilateral    Hx of blood clots          Current Outpatient Medications:     celecoxib (CELEBREX) 200 MG capsule, Take 1 capsule by mouth 2 times daily, Disp: 60 capsule, Rfl: 2    Ferrous Sulfate (IRON PO), Take by mouth daily, Disp: , Rfl:     chlorhexidine (PERIDEX) 0.12 % solution, RINSE MOUTH WITH 15ML (1 CAPFUL) FOR 30 SECONDS IN MORNING AND EVENING AFTER MEALS, THEN SPIT, Disp: , Rfl:     BD PEN NEEDLE RODNEY 2ND GEN 32G X 4 MM MISC, USE ONCE DAILY WITH VICTOZA, Disp: , Rfl:     lidocaine (LIDODERM) 5 %, APPLY 1 PATCH AND LEAVE IN PLACE FOR 12 HOURS, THEN REMOVE AND LEAVE OFF FOR 12 HOURS, Disp: , Rfl:

## 2024-02-09 ENCOUNTER — ANESTHESIA EVENT (OUTPATIENT)
Facility: HOSPITAL | Age: 54
End: 2024-02-09
Payer: COMMERCIAL

## 2024-02-09 ENCOUNTER — TELEPHONE (OUTPATIENT)
Facility: HOSPITAL | Age: 54
End: 2024-02-09

## 2024-02-09 NOTE — TELEPHONE ENCOUNTER
Call placed to patient, ID verified x 2. Patient  has decided with their surgeon to have a total knee  replacement to decrease  pain and improve mobility . Topics discussed included surgery preparation, what to expect the day of surgery, medications, physical and occupational therapy, and discharge planning.  It was discussed that this is considered an elective surgery and that prior to the surgery  decisions such as arranging for help at home once they are discharged needs to be made.Patient agreed to get home ready for surgery and to have a ride arranged to go home. She identifies her daughter as her support system, has all required DME and would like to discharge home day of surgery. She lives in a two story home with 5 stairs to enter. Her bedroom is downstairs.  Instructions were given for CHG bathing. Patient will complete the procedure the morning of surgery. She denies any rashes, bruises or open areas to her skin at this time.  Patient was reminded not to apply any deoderant, perfumes, makeup or lotions to skin the morning of surgery. She will remain NPO after midnight and  will take only the medications as instructed to take by her surgeon the morning of surgery with a sip of water. Patient verbalized that she does not take any medications in the morning. A total knee replacement   education book was provided to the patient at the clinic level and all education was reviewed on the preop call.  Education regarding the importance of early and frequent ambulation to avoid surgical complications and to assist with pain was provided. Recommended the use of ice to assist with pain and swelling post op for 20 minutes an hour, not to be placed directly on her skin. Patient verbalized understanding of all information provided.  Opportunity was given to ask questions and phone number of the Orthopaedic   was given for any questions or concerns that may arise later.

## 2024-02-12 ENCOUNTER — APPOINTMENT (OUTPATIENT)
Facility: HOSPITAL | Age: 54
End: 2024-02-12
Attending: ORTHOPAEDIC SURGERY
Payer: COMMERCIAL

## 2024-02-12 ENCOUNTER — HOSPITAL ENCOUNTER (OUTPATIENT)
Facility: HOSPITAL | Age: 54
Setting detail: OBSERVATION
Discharge: HOME | End: 2024-02-12
Attending: ORTHOPAEDIC SURGERY | Admitting: ORTHOPAEDIC SURGERY
Payer: COMMERCIAL

## 2024-02-12 ENCOUNTER — HOME HEALTH ADMISSION (OUTPATIENT)
Age: 54
End: 2024-02-12
Payer: COMMERCIAL

## 2024-02-12 ENCOUNTER — ANESTHESIA (OUTPATIENT)
Facility: HOSPITAL | Age: 54
End: 2024-02-12
Payer: COMMERCIAL

## 2024-02-12 VITALS
DIASTOLIC BLOOD PRESSURE: 71 MMHG | WEIGHT: 282.2 LBS | SYSTOLIC BLOOD PRESSURE: 117 MMHG | BODY MASS INDEX: 39.51 KG/M2 | HEART RATE: 77 BPM | RESPIRATION RATE: 16 BRPM | OXYGEN SATURATION: 96 % | HEIGHT: 71 IN | TEMPERATURE: 97.5 F

## 2024-02-12 DIAGNOSIS — S82.001A CLOSED DISPLACED FRACTURE OF RIGHT PATELLA, UNSPECIFIED FRACTURE MORPHOLOGY, INITIAL ENCOUNTER: Primary | ICD-10-CM

## 2024-02-12 DIAGNOSIS — M17.11 ARTHRITIS OF KNEE, RIGHT: ICD-10-CM

## 2024-02-12 PROBLEM — E66.9 OBESITY (BMI 35.0-39.9 WITHOUT COMORBIDITY): Status: ACTIVE | Noted: 2024-02-12

## 2024-02-12 PROBLEM — M21.861: Status: ACTIVE | Noted: 2024-02-12

## 2024-02-12 PROBLEM — M21.961 ACQUIRED DEFORMITY OF KNEE, RIGHT: Status: ACTIVE | Noted: 2024-02-12

## 2024-02-12 PROBLEM — M17.10 ARTHRITIS OF KNEE: Status: ACTIVE | Noted: 2024-02-12

## 2024-02-12 PROBLEM — M25.361 KNEE INSTABILITY, RIGHT: Status: ACTIVE | Noted: 2024-02-12

## 2024-02-12 LAB — HCG UR QL: NEGATIVE

## 2024-02-12 PROCEDURE — 97535 SELF CARE MNGMENT TRAINING: CPT

## 2024-02-12 PROCEDURE — 97165 OT EVAL LOW COMPLEX 30 MIN: CPT

## 2024-02-12 PROCEDURE — 6360000002 HC RX W HCPCS: Performed by: NURSE ANESTHETIST, CERTIFIED REGISTERED

## 2024-02-12 PROCEDURE — 6360000002 HC RX W HCPCS: Performed by: ORTHOPAEDIC SURGERY

## 2024-02-12 PROCEDURE — C9290 INJ, BUPIVACAINE LIPOSOME: HCPCS | Performed by: ORTHOPAEDIC SURGERY

## 2024-02-12 PROCEDURE — 7100000001 HC PACU RECOVERY - ADDTL 15 MIN: Performed by: ORTHOPAEDIC SURGERY

## 2024-02-12 PROCEDURE — 27447 TOTAL KNEE ARTHROPLASTY: CPT | Performed by: ORTHOPAEDIC SURGERY

## 2024-02-12 PROCEDURE — 6360000002 HC RX W HCPCS: Performed by: ANESTHESIOLOGY

## 2024-02-12 PROCEDURE — 2500000003 HC RX 250 WO HCPCS: Performed by: NURSE ANESTHETIST, CERTIFIED REGISTERED

## 2024-02-12 PROCEDURE — 97161 PT EVAL LOW COMPLEX 20 MIN: CPT

## 2024-02-12 PROCEDURE — 6370000000 HC RX 637 (ALT 250 FOR IP): Performed by: PHYSICIAN ASSISTANT

## 2024-02-12 PROCEDURE — 64447 NJX AA&/STRD FEMORAL NRV IMG: CPT | Performed by: ANESTHESIOLOGY

## 2024-02-12 PROCEDURE — 6370000000 HC RX 637 (ALT 250 FOR IP): Performed by: ORTHOPAEDIC SURGERY

## 2024-02-12 PROCEDURE — 73560 X-RAY EXAM OF KNEE 1 OR 2: CPT

## 2024-02-12 PROCEDURE — G0378 HOSPITAL OBSERVATION PER HR: HCPCS

## 2024-02-12 PROCEDURE — A4217 STERILE WATER/SALINE, 500 ML: HCPCS | Performed by: ORTHOPAEDIC SURGERY

## 2024-02-12 PROCEDURE — 81025 URINE PREGNANCY TEST: CPT

## 2024-02-12 PROCEDURE — 3700000000 HC ANESTHESIA ATTENDED CARE: Performed by: ORTHOPAEDIC SURGERY

## 2024-02-12 PROCEDURE — 7100000000 HC PACU RECOVERY - FIRST 15 MIN: Performed by: ORTHOPAEDIC SURGERY

## 2024-02-12 PROCEDURE — 27447 TOTAL KNEE ARTHROPLASTY: CPT | Performed by: PHYSICIAN ASSISTANT

## 2024-02-12 PROCEDURE — 2580000003 HC RX 258: Performed by: ORTHOPAEDIC SURGERY

## 2024-02-12 PROCEDURE — 97116 GAIT TRAINING THERAPY: CPT

## 2024-02-12 PROCEDURE — 2709999900 HC NON-CHARGEABLE SUPPLY: Performed by: ORTHOPAEDIC SURGERY

## 2024-02-12 PROCEDURE — C1713 ANCHOR/SCREW BN/BN,TIS/BN: HCPCS | Performed by: ORTHOPAEDIC SURGERY

## 2024-02-12 PROCEDURE — 2580000003 HC RX 258: Performed by: PHYSICIAN ASSISTANT

## 2024-02-12 PROCEDURE — 6360000002 HC RX W HCPCS: Performed by: PHYSICIAN ASSISTANT

## 2024-02-12 PROCEDURE — 2500000003 HC RX 250 WO HCPCS: Performed by: PHYSICIAN ASSISTANT

## 2024-02-12 PROCEDURE — A4216 STERILE WATER/SALINE, 10 ML: HCPCS | Performed by: ANESTHESIOLOGY

## 2024-02-12 PROCEDURE — 2580000003 HC RX 258: Performed by: NURSE ANESTHETIST, CERTIFIED REGISTERED

## 2024-02-12 PROCEDURE — 2500000003 HC RX 250 WO HCPCS: Performed by: ANESTHESIOLOGY

## 2024-02-12 PROCEDURE — C1776 JOINT DEVICE (IMPLANTABLE): HCPCS | Performed by: ORTHOPAEDIC SURGERY

## 2024-02-12 PROCEDURE — A4216 STERILE WATER/SALINE, 10 ML: HCPCS | Performed by: ORTHOPAEDIC SURGERY

## 2024-02-12 PROCEDURE — 2580000003 HC RX 258: Performed by: ANESTHESIOLOGY

## 2024-02-12 PROCEDURE — 3600000003 HC SURGERY LEVEL 3 BASE: Performed by: ORTHOPAEDIC SURGERY

## 2024-02-12 PROCEDURE — 2720000010 HC SURG SUPPLY STERILE: Performed by: ORTHOPAEDIC SURGERY

## 2024-02-12 PROCEDURE — 3700000001 HC ADD 15 MINUTES (ANESTHESIA): Performed by: ORTHOPAEDIC SURGERY

## 2024-02-12 PROCEDURE — 3600000013 HC SURGERY LEVEL 3 ADDTL 15MIN: Performed by: ORTHOPAEDIC SURGERY

## 2024-02-12 DEVICE — LEGION PS HIGH FLEX XLPE SZ 5-6 12MM
Type: IMPLANTABLE DEVICE | Site: KNEE | Status: FUNCTIONAL
Brand: LEGION

## 2024-02-12 DEVICE — GENESIS II NON-POROUS TIBIAL                                    BASEPLATE SIZE 6 RIGHT
Type: IMPLANTABLE DEVICE | Site: KNEE | Status: FUNCTIONAL
Brand: GENESIS II

## 2024-02-12 DEVICE — CEMENT BNE 20ML 41GM FULL DOSE PMMA W/ TOBRA M VISC RADPQ: Type: IMPLANTABLE DEVICE | Site: KNEE | Status: FUNCTIONAL

## 2024-02-12 DEVICE — GENESIS TROCHLEAR PIN 1/8 X 3
Type: IMPLANTABLE DEVICE | Site: KNEE | Status: FUNCTIONAL
Brand: GENESIS

## 2024-02-12 DEVICE — GENESIS II OXINIUM POSTERIOR                                    STABILIZED FEMORAL RIGHT SIZE 8
Type: IMPLANTABLE DEVICE | Site: KNEE | Status: FUNCTIONAL
Brand: GENESIS II

## 2024-02-12 DEVICE — IMPLANT PAT DIA35MM THK10MM X3 ASYM TRIATHLON: Type: IMPLANTABLE DEVICE | Site: PATELLA | Status: FUNCTIONAL

## 2024-02-12 RX ORDER — SUCCINYLCHOLINE/SOD CL,ISO/PF 100 MG/5ML
SYRINGE (ML) INTRAVENOUS PRN
Status: DISCONTINUED | OUTPATIENT
Start: 2024-02-12 | End: 2024-02-12 | Stop reason: SDUPTHER

## 2024-02-12 RX ORDER — METOPROLOL TARTRATE 1 MG/ML
INJECTION, SOLUTION INTRAVENOUS PRN
Status: DISCONTINUED | OUTPATIENT
Start: 2024-02-12 | End: 2024-02-12 | Stop reason: SDUPTHER

## 2024-02-12 RX ORDER — ONDANSETRON 4 MG/1
4 TABLET, FILM COATED ORAL EVERY 8 HOURS PRN
Qty: 30 TABLET | Refills: 2 | Status: SHIPPED | OUTPATIENT
Start: 2024-02-12

## 2024-02-12 RX ORDER — SODIUM CHLORIDE, SODIUM LACTATE, POTASSIUM CHLORIDE, CALCIUM CHLORIDE 600; 310; 30; 20 MG/100ML; MG/100ML; MG/100ML; MG/100ML
INJECTION, SOLUTION INTRAVENOUS CONTINUOUS
Status: DISCONTINUED | OUTPATIENT
Start: 2024-02-12 | End: 2024-02-12 | Stop reason: HOSPADM

## 2024-02-12 RX ORDER — CELECOXIB 100 MG/1
200 CAPSULE ORAL 2 TIMES DAILY
Status: DISCONTINUED | OUTPATIENT
Start: 2024-02-13 | End: 2024-02-12 | Stop reason: HOSPADM

## 2024-02-12 RX ORDER — PROPOFOL 10 MG/ML
INJECTION, EMULSION INTRAVENOUS PRN
Status: DISCONTINUED | OUTPATIENT
Start: 2024-02-12 | End: 2024-02-12 | Stop reason: SDUPTHER

## 2024-02-12 RX ORDER — SODIUM CHLORIDE 0.9 % (FLUSH) 0.9 %
5-40 SYRINGE (ML) INJECTION PRN
Status: DISCONTINUED | OUTPATIENT
Start: 2024-02-12 | End: 2024-02-12 | Stop reason: HOSPADM

## 2024-02-12 RX ORDER — HYDRALAZINE HYDROCHLORIDE 20 MG/ML
INJECTION INTRAMUSCULAR; INTRAVENOUS PRN
Status: DISCONTINUED | OUTPATIENT
Start: 2024-02-12 | End: 2024-02-12 | Stop reason: SDUPTHER

## 2024-02-12 RX ORDER — OXYCODONE HYDROCHLORIDE 10 MG/1
10 TABLET ORAL
Status: DISCONTINUED | OUTPATIENT
Start: 2024-02-12 | End: 2024-02-12 | Stop reason: HOSPADM

## 2024-02-12 RX ORDER — ONDANSETRON 4 MG/1
4 TABLET, ORALLY DISINTEGRATING ORAL EVERY 6 HOURS PRN
Status: DISCONTINUED | OUTPATIENT
Start: 2024-02-12 | End: 2024-02-12 | Stop reason: HOSPADM

## 2024-02-12 RX ORDER — DEXAMETHASONE SODIUM PHOSPHATE 4 MG/ML
INJECTION, SOLUTION INTRA-ARTICULAR; INTRALESIONAL; INTRAMUSCULAR; INTRAVENOUS; SOFT TISSUE PRN
Status: DISCONTINUED | OUTPATIENT
Start: 2024-02-12 | End: 2024-02-12 | Stop reason: SDUPTHER

## 2024-02-12 RX ORDER — TAMSULOSIN HYDROCHLORIDE 0.4 MG/1
0.4 CAPSULE ORAL DAILY
Status: DISCONTINUED | OUTPATIENT
Start: 2024-02-12 | End: 2024-02-12 | Stop reason: HOSPADM

## 2024-02-12 RX ORDER — OXYCODONE HYDROCHLORIDE 5 MG/1
5-10 TABLET ORAL EVERY 6 HOURS PRN
Qty: 42 TABLET | Refills: 0 | Status: SHIPPED | OUTPATIENT
Start: 2024-02-12 | End: 2024-02-19

## 2024-02-12 RX ORDER — SODIUM CHLORIDE 0.9 % (FLUSH) 0.9 %
5-40 SYRINGE (ML) INJECTION EVERY 12 HOURS SCHEDULED
Status: DISCONTINUED | OUTPATIENT
Start: 2024-02-12 | End: 2024-02-12 | Stop reason: HOSPADM

## 2024-02-12 RX ORDER — LIDOCAINE HYDROCHLORIDE 20 MG/ML
INJECTION, SOLUTION EPIDURAL; INFILTRATION; INTRACAUDAL; PERINEURAL PRN
Status: DISCONTINUED | OUTPATIENT
Start: 2024-02-12 | End: 2024-02-12 | Stop reason: SDUPTHER

## 2024-02-12 RX ORDER — SENNA AND DOCUSATE SODIUM 50; 8.6 MG/1; MG/1
1 TABLET, FILM COATED ORAL 2 TIMES DAILY
Status: DISCONTINUED | OUTPATIENT
Start: 2024-02-12 | End: 2024-02-12 | Stop reason: HOSPADM

## 2024-02-12 RX ORDER — NEOSTIGMINE METHYLSULFATE 1 MG/ML
INJECTION, SOLUTION INTRAVENOUS PRN
Status: DISCONTINUED | OUTPATIENT
Start: 2024-02-12 | End: 2024-02-12 | Stop reason: SDUPTHER

## 2024-02-12 RX ORDER — KETOROLAC TROMETHAMINE 15 MG/ML
15 INJECTION, SOLUTION INTRAMUSCULAR; INTRAVENOUS EVERY 6 HOURS
Status: DISCONTINUED | OUTPATIENT
Start: 2024-02-12 | End: 2024-02-12 | Stop reason: HOSPADM

## 2024-02-12 RX ORDER — ONDANSETRON 2 MG/ML
INJECTION INTRAMUSCULAR; INTRAVENOUS PRN
Status: DISCONTINUED | OUTPATIENT
Start: 2024-02-12 | End: 2024-02-12 | Stop reason: SDUPTHER

## 2024-02-12 RX ORDER — POLYETHYLENE GLYCOL 3350 17 G/17G
17 POWDER, FOR SOLUTION ORAL DAILY PRN
Status: DISCONTINUED | OUTPATIENT
Start: 2024-02-12 | End: 2024-02-12 | Stop reason: HOSPADM

## 2024-02-12 RX ORDER — NALOXONE HYDROCHLORIDE 4 MG/.1ML
1 SPRAY NASAL PRN
Qty: 1 EACH | Refills: 1 | Status: SHIPPED | OUTPATIENT
Start: 2024-02-12

## 2024-02-12 RX ORDER — DEXAMETHASONE SODIUM PHOSPHATE 4 MG/ML
8 INJECTION, SOLUTION INTRA-ARTICULAR; INTRALESIONAL; INTRAMUSCULAR; INTRAVENOUS; SOFT TISSUE
Status: COMPLETED | OUTPATIENT
Start: 2024-02-12 | End: 2024-02-12

## 2024-02-12 RX ORDER — ROPIVACAINE HYDROCHLORIDE 2 MG/ML
30 INJECTION, SOLUTION EPIDURAL; INFILTRATION; PERINEURAL ONCE
Status: COMPLETED | OUTPATIENT
Start: 2024-02-12 | End: 2024-02-12

## 2024-02-12 RX ORDER — PHENYLEPHRINE HCL IN 0.9% NACL 1 MG/10 ML
SYRINGE (ML) INTRAVENOUS PRN
Status: DISCONTINUED | OUTPATIENT
Start: 2024-02-12 | End: 2024-02-12 | Stop reason: SDUPTHER

## 2024-02-12 RX ORDER — PROCHLORPERAZINE EDISYLATE 5 MG/ML
10 INJECTION INTRAMUSCULAR; INTRAVENOUS
Status: DISCONTINUED | OUTPATIENT
Start: 2024-02-12 | End: 2024-02-12 | Stop reason: HOSPADM

## 2024-02-12 RX ORDER — PREGABALIN 75 MG/1
75 CAPSULE ORAL 2 TIMES DAILY
Status: DISCONTINUED | OUTPATIENT
Start: 2024-02-12 | End: 2024-02-12 | Stop reason: HOSPADM

## 2024-02-12 RX ORDER — ASPIRIN 81 MG/1
81 TABLET ORAL 2 TIMES DAILY
Qty: 60 TABLET | Refills: 3 | Status: SHIPPED | OUTPATIENT
Start: 2024-02-12

## 2024-02-12 RX ORDER — SODIUM CHLORIDE 9 MG/ML
INJECTION, SOLUTION INTRAVENOUS PRN
Status: DISCONTINUED | OUTPATIENT
Start: 2024-02-12 | End: 2024-02-12 | Stop reason: HOSPADM

## 2024-02-12 RX ORDER — SODIUM CHLORIDE 9 MG/ML
INJECTION, SOLUTION INTRAVENOUS CONTINUOUS
Status: DISCONTINUED | OUTPATIENT
Start: 2024-02-12 | End: 2024-02-12 | Stop reason: HOSPADM

## 2024-02-12 RX ORDER — FENTANYL CITRATE 50 UG/ML
100 INJECTION, SOLUTION INTRAMUSCULAR; INTRAVENOUS ONCE
Status: COMPLETED | OUTPATIENT
Start: 2024-02-12 | End: 2024-02-12

## 2024-02-12 RX ORDER — FAMOTIDINE 20 MG/1
20 TABLET, FILM COATED ORAL 2 TIMES DAILY
Status: DISCONTINUED | OUTPATIENT
Start: 2024-02-12 | End: 2024-02-12 | Stop reason: HOSPADM

## 2024-02-12 RX ORDER — ACETAMINOPHEN 325 MG/1
1000 TABLET ORAL
Status: COMPLETED | OUTPATIENT
Start: 2024-02-12 | End: 2024-02-12

## 2024-02-12 RX ORDER — ROPIVACAINE HYDROCHLORIDE 2 MG/ML
INJECTION, SOLUTION EPIDURAL; INFILTRATION; PERINEURAL
Status: COMPLETED | OUTPATIENT
Start: 2024-02-12 | End: 2024-02-12

## 2024-02-12 RX ORDER — ASPIRIN 81 MG/1
81 TABLET ORAL 2 TIMES DAILY
Status: DISCONTINUED | OUTPATIENT
Start: 2024-02-13 | End: 2024-02-12 | Stop reason: HOSPADM

## 2024-02-12 RX ORDER — ONDANSETRON 2 MG/ML
4 INJECTION INTRAMUSCULAR; INTRAVENOUS
Status: DISCONTINUED | OUTPATIENT
Start: 2024-02-12 | End: 2024-02-12 | Stop reason: HOSPADM

## 2024-02-12 RX ORDER — DOCUSATE SODIUM 100 MG/1
100 CAPSULE, LIQUID FILLED ORAL 2 TIMES DAILY
Qty: 60 CAPSULE | Refills: 1 | Status: SHIPPED | OUTPATIENT
Start: 2024-02-12 | End: 2024-04-12

## 2024-02-12 RX ORDER — GLYCOPYRROLATE 0.2 MG/ML
INJECTION INTRAMUSCULAR; INTRAVENOUS PRN
Status: DISCONTINUED | OUTPATIENT
Start: 2024-02-12 | End: 2024-02-12 | Stop reason: SDUPTHER

## 2024-02-12 RX ORDER — LABETALOL HYDROCHLORIDE 5 MG/ML
INJECTION, SOLUTION INTRAVENOUS PRN
Status: DISCONTINUED | OUTPATIENT
Start: 2024-02-12 | End: 2024-02-12 | Stop reason: SDUPTHER

## 2024-02-12 RX ORDER — MIDAZOLAM HYDROCHLORIDE 2 MG/2ML
2 INJECTION, SOLUTION INTRAMUSCULAR; INTRAVENOUS ONCE
Status: COMPLETED | OUTPATIENT
Start: 2024-02-12 | End: 2024-02-12

## 2024-02-12 RX ORDER — CELECOXIB 200 MG/1
200 CAPSULE ORAL 2 TIMES DAILY
Qty: 60 CAPSULE | Refills: 2 | Status: SHIPPED | OUTPATIENT
Start: 2024-02-12

## 2024-02-12 RX ORDER — KETOROLAC TROMETHAMINE 30 MG/ML
30 INJECTION, SOLUTION INTRAMUSCULAR; INTRAVENOUS EVERY 6 HOURS
Status: DISCONTINUED | OUTPATIENT
Start: 2024-02-12 | End: 2024-02-12

## 2024-02-12 RX ORDER — ROCURONIUM BROMIDE 10 MG/ML
INJECTION, SOLUTION INTRAVENOUS PRN
Status: DISCONTINUED | OUTPATIENT
Start: 2024-02-12 | End: 2024-02-12 | Stop reason: SDUPTHER

## 2024-02-12 RX ORDER — FENTANYL CITRATE 50 UG/ML
50 INJECTION, SOLUTION INTRAMUSCULAR; INTRAVENOUS EVERY 5 MIN PRN
Status: DISCONTINUED | OUTPATIENT
Start: 2024-02-12 | End: 2024-02-12 | Stop reason: HOSPADM

## 2024-02-12 RX ORDER — OXYCODONE HYDROCHLORIDE 5 MG/1
5 TABLET ORAL
Status: DISCONTINUED | OUTPATIENT
Start: 2024-02-12 | End: 2024-02-12 | Stop reason: HOSPADM

## 2024-02-12 RX ORDER — ONDANSETRON 2 MG/ML
4 INJECTION INTRAMUSCULAR; INTRAVENOUS EVERY 6 HOURS PRN
Status: DISCONTINUED | OUTPATIENT
Start: 2024-02-12 | End: 2024-02-12 | Stop reason: HOSPADM

## 2024-02-12 RX ORDER — ACETAMINOPHEN 500 MG
1000 TABLET ORAL EVERY 6 HOURS
Status: DISCONTINUED | OUTPATIENT
Start: 2024-02-12 | End: 2024-02-12 | Stop reason: HOSPADM

## 2024-02-12 RX ORDER — CEFADROXIL 500 MG/1
500 CAPSULE ORAL 2 TIMES DAILY
Qty: 10 CAPSULE | Refills: 0 | Status: SHIPPED | OUTPATIENT
Start: 2024-02-12 | End: 2024-02-17

## 2024-02-12 RX ADMIN — DEXMEDETOMIDINE HYDROCHLORIDE 6 MCG: 100 INJECTION, SOLUTION INTRAVENOUS at 10:25

## 2024-02-12 RX ADMIN — SODIUM CHLORIDE: 9 INJECTION, SOLUTION INTRAVENOUS at 15:04

## 2024-02-12 RX ADMIN — ROCURONIUM BROMIDE 30 MG: 10 INJECTION, SOLUTION INTRAVENOUS at 10:20

## 2024-02-12 RX ADMIN — NEOSTIGMINE METHYLSULFATE 3 MG: 1 INJECTION INTRAVENOUS at 11:43

## 2024-02-12 RX ADMIN — ROCURONIUM BROMIDE 10 MG: 10 INJECTION, SOLUTION INTRAVENOUS at 10:12

## 2024-02-12 RX ADMIN — HYDRALAZINE HYDROCHLORIDE 10 MG: 20 INJECTION INTRAMUSCULAR; INTRAVENOUS at 10:17

## 2024-02-12 RX ADMIN — ROPIVACAINE HYDROCHLORIDE 30 ML: 2 INJECTION EPIDURAL; INFILTRATION; PERINEURAL at 09:25

## 2024-02-12 RX ADMIN — PROPOFOL 200 MG: 10 INJECTION, EMULSION INTRAVENOUS at 10:12

## 2024-02-12 RX ADMIN — Medication 200 MCG: at 12:59

## 2024-02-12 RX ADMIN — GLYCOPYRROLATE 0.4 MG: 0.2 INJECTION INTRAMUSCULAR; INTRAVENOUS at 11:43

## 2024-02-12 RX ADMIN — PREGABALIN 75 MG: 75 CAPSULE ORAL at 08:52

## 2024-02-12 RX ADMIN — LIDOCAINE HYDROCHLORIDE 100 MG: 20 INJECTION, SOLUTION EPIDURAL; INFILTRATION; INTRACAUDAL; PERINEURAL at 10:12

## 2024-02-12 RX ADMIN — FENTANYL CITRATE 100 MCG: 50 INJECTION INTRAMUSCULAR; INTRAVENOUS at 09:26

## 2024-02-12 RX ADMIN — HYDRALAZINE HYDROCHLORIDE 10 MG: 20 INJECTION INTRAMUSCULAR; INTRAVENOUS at 10:44

## 2024-02-12 RX ADMIN — DEXAMETHASONE SODIUM PHOSPHATE 8 MG: 4 INJECTION INTRA-ARTICULAR; INTRALESIONAL; INTRAMUSCULAR; INTRAVENOUS; SOFT TISSUE at 08:52

## 2024-02-12 RX ADMIN — FAMOTIDINE 20 MG: 10 INJECTION INTRAVENOUS at 08:52

## 2024-02-12 RX ADMIN — DEXAMETHASONE SODIUM PHOSPHATE 4 MG: 4 INJECTION INTRA-ARTICULAR; INTRALESIONAL; INTRAMUSCULAR; INTRAVENOUS; SOFT TISSUE at 10:29

## 2024-02-12 RX ADMIN — TRANEXAMIC ACID 1000 MG: 100 INJECTION, SOLUTION INTRAVENOUS at 11:48

## 2024-02-12 RX ADMIN — ONDANSETRON 4 MG: 2 INJECTION INTRAMUSCULAR; INTRAVENOUS at 10:29

## 2024-02-12 RX ADMIN — OXYCODONE 5 MG: 5 TABLET ORAL at 16:13

## 2024-02-12 RX ADMIN — METOPROLOL TARTRATE 1 MG: 5 INJECTION INTRAVENOUS at 10:38

## 2024-02-12 RX ADMIN — SUGAMMADEX 200 MG: 100 INJECTION, SOLUTION INTRAVENOUS at 12:13

## 2024-02-12 RX ADMIN — TRANEXAMIC ACID 1000 MG: 100 INJECTION, SOLUTION INTRAVENOUS at 10:31

## 2024-02-12 RX ADMIN — ROPIVACAINE HYDROCHLORIDE 30 ML: 2 INJECTION EPIDURAL; INFILTRATION; PERINEURAL at 09:30

## 2024-02-12 RX ADMIN — Medication 100 MG: at 10:12

## 2024-02-12 RX ADMIN — TAMSULOSIN HYDROCHLORIDE 0.4 MG: 0.4 CAPSULE ORAL at 08:52

## 2024-02-12 RX ADMIN — PROPOFOL 30 MG: 10 INJECTION, EMULSION INTRAVENOUS at 11:58

## 2024-02-12 RX ADMIN — ACETAMINOPHEN 325MG 975 MG: 325 TABLET ORAL at 08:52

## 2024-02-12 RX ADMIN — DEXMEDETOMIDINE HYDROCHLORIDE 10 MCG: 100 INJECTION, SOLUTION INTRAVENOUS at 10:07

## 2024-02-12 RX ADMIN — MIDAZOLAM 2 MG: 1 INJECTION INTRAMUSCULAR; INTRAVENOUS at 09:26

## 2024-02-12 RX ADMIN — METOPROLOL TARTRATE 2 MG: 5 INJECTION INTRAVENOUS at 10:32

## 2024-02-12 RX ADMIN — WATER 3000 MG: 1 INJECTION, SOLUTION INTRAMUSCULAR; INTRAVENOUS; SUBCUTANEOUS at 10:22

## 2024-02-12 RX ADMIN — METOPROLOL TARTRATE 1 MG: 5 INJECTION INTRAVENOUS at 10:44

## 2024-02-12 RX ADMIN — LABETALOL HYDROCHLORIDE 5 MG: 5 INJECTION, SOLUTION INTRAVENOUS at 12:15

## 2024-02-12 RX ADMIN — GLYCOPYRROLATE 0.2 MG: 0.2 INJECTION INTRAMUSCULAR; INTRAVENOUS at 10:07

## 2024-02-12 RX ADMIN — SODIUM CHLORIDE, POTASSIUM CHLORIDE, SODIUM LACTATE AND CALCIUM CHLORIDE: 600; 310; 30; 20 INJECTION, SOLUTION INTRAVENOUS at 08:50

## 2024-02-12 NOTE — PROGRESS NOTES
PHYSICAL THERAPY EVALUATION/DISCHARGE    Patient: Aleida Magdaleno (53 y.o. female)  Date: 2/12/2024  Primary Diagnosis: Osteoarthritis of right knee, unspecified osteoarthritis type [M17.11]  Arthritis of knee [M17.10]  Procedure(s) (LRB):  RIGHT TOTAL KNEE REPLACEMENT (Right) Day of Surgery   Precautions: Fall Risk,  ,  ,  ,  ,  ,  ,    PLOF: Mod I with mobility and ADLs.     ASSESSMENT AND RECOMMENDATIONS:  Patient resting in recliner chair ,alert, and agreeable to PT evaluation. Educated on LE exercises, role of PT, and WB status. Supervision sit>stand transfer. She ambulates 100 ft at very slow pace using RW. She negotiates stairs using B HR and she has cane at home if she needs. Patient returns to room and left with legs down to eat lunch. All needs left within reach and chair alarm activated.     Further Equipment Recommendations for Discharge: none    AMPAC:    23      Current research shows that an AM-PAC score of 18 (14 without stairs) or greater is associated with a discharge to the patient's home setting. Based on an AM-PAC score and their current functional mobility deficits, it is recommended that the patient have 2-3 sessions per week of Physical Therapy at d/c to increase the patient's independence.      This AMPAC score should be considered in conjunction with interdisciplinary team recommendations to determine the most appropriate discharge setting. Patient's social support, diagnosis, medical stability, and prior level of function should also be taken into consideration.     SUBJECTIVE:   Patient stated “I have 3 daughters.”    OBJECTIVE DATA SUMMARY:     Past Medical History:   Diagnosis Date    DVT (deep venous thrombosis) (HCC) 2003    History of pulmonary embolus (PE) 12/2017    bilateral    Hx of blood clots      Past Surgical History:   Procedure Laterality Date    CHOLECYSTECTOMY      GASTRIC BYPASS SURGERY      TOTAL KNEE ARTHROPLASTY Left 10/11/2023    LEFT TOTAL KNEE ARTHROPLASTY;

## 2024-02-12 NOTE — ANESTHESIA POSTPROCEDURE EVALUATION
Department of Anesthesiology  Postprocedure Note    Patient: Aleida Magdaleno  MRN: [id number]  YOB: 1970  Date of evaluation: 2/12/2024    Procedure Summary       Date: 02/12/24 Room / Location: [facility] MAIN 07 / [facility] MAIN OR    Anesthesia Start: 1007 Anesthesia Stop: 1227    Procedure: RIGHT TOTAL KNEE REPLACEMENT (Right: Knee) Diagnosis:       Osteoarthritis of right knee, unspecified osteoarthritis type      (Osteoarthritis of right knee, unspecified osteoarthritis type [M17.11])    Surgeons: Ramesh De Paz MD Responsible Provider: Joe Rincon DO    Anesthesia Type: General ASA Status: 2            Anesthesia Type: General    Kofi Phase I: Kofi Score: 9    Kofi Phase II:      Anesthesia Post Evaluation    Patient location during evaluation: PACU  Patient participation: complete - patient participated  Level of consciousness: sleepy but conscious  Pain score: 0  Airway patency: patent  Nausea & Vomiting: no nausea and no vomiting  Cardiovascular status: blood pressure returned to baseline  Respiratory status: acceptable  Hydration status: euvolemic  Pain management: adequate    No notable events documented.

## 2024-02-12 NOTE — ANESTHESIA PROCEDURE NOTES
Peripheral Block    Patient location during procedure: pre-op  Reason for block: post-op pain management and at surgeon's request  Start time: 2/12/2024 9:25 AM  End time: 2/12/2024 9:34 AM  Staffing  Performed: anesthesiologist   Anesthesiologist: Joe Rincon DO  Performed by: Joe Rincon DO  Authorized by: Joe Rincon DO    Preanesthetic Checklist  Completed: patient identified, IV checked, site marked, risks and benefits discussed, surgical/procedural consents, equipment checked, pre-op evaluation, timeout performed, anesthesia consent given, oxygen available, monitors applied/VS acknowledged, fire risk safety assessment completed and verbalized and blood product R/B/A discussed and consented  Peripheral Block   Patient position: supine  Prep: ChloraPrep  Provider prep: mask and sterile gloves  Patient monitoring: cardiac monitor, continuous pulse ox, frequent blood pressure checks, IV access, oxygen and responsive to questions  Block type: Femoral  Adductor canal  Laterality: right  Injection technique: single-shot  Guidance: ultrasound guided    Needle   Needle type: insulated echogenic nerve stimulator needle   Needle gauge: 21 G  Needle localization: ultrasound guidance  Needle length: 10 cm  Assessment   Injection assessment: negative aspiration for heme, no paresthesia on injection, local visualized surrounding nerve on ultrasound and no intravascular symptoms  Paresthesia pain: none  Slow fractionated injection: yes  Hemodynamics: stable  Real-time US image taken/store: yes  Outcomes: patient tolerated procedure well    Medications Administered  ropivacaine (NAROPIN) injection 0.2% - Perineural   30 mL - 2/12/2024 9:25:00 AM

## 2024-02-12 NOTE — ANESTHESIA PRE PROCEDURE
Department of Anesthesiology  Preprocedure Note       Name:  Aleida Magdaleno   Age:  53 y.o.  :  1970                                          MRN:  [id number]         Date:  2024      Surgeon: Surgeon(s):  Ramesh De Paz MD    Procedure: Procedure(s):  RIGHT TOTAL KNEE ARTHROPLASTY; OSBORNE TO ASSIST [SM[facility]&NEPHEW ORTHO]; NERVE BLOCK; 23 HR    Medications prior to admission:   Prior to Admission medications    Medication Sig Start Date End Date Taking? Authorizing Provider   celecoxib (CELEBREX) 200 MG capsule Take 1 capsule by mouth 2 times daily 10/11/23   Chad Osborne PA-C   Ferrous Sulfate (IRON PO) Take by mouth daily    ProviderRichard MD   chlorhexidine (PERIDEX) 0.12 % solution RINSE MOUTH WITH 15ML (1 CAPFUL) FOR 30 SECONDS IN MORNING AND EVENING AFTER MEALS, THEN SPIT 23   Richard Emery MD   BD PEN NEEDLE RODNEY 2ND GEN 32G X 4 MM MISC USE ONCE DAILY WITH VICTOZA 23   Richard Emery MD   lidocaine (LIDODERM) 5 % APPLY 1 PATCH AND LEAVE IN PLACE FOR 12 HOURS, THEN REMOVE AND LEAVE OFF FOR 12 HOURS 23   Richard Emery MD   acetaminophen (TYLENOL) 500 MG tablet Take by mouth every 6 hours as needed    Automatic Reconciliation, Ar       Current medications:    Current Facility-Administered Medications   Medication Dose Route Frequency Provider Last Rate Last Admin    lactated ringers IV soln infusion   IntraVENous Continuous Steve Paredes MD        famotidine (PEPCID) 20 mg in sodium chloride (PF) 0.9 % 10 mL injection  20 mg IntraVENous Once Steve Paredes MD        acetaminophen (TYLENOL) tablet 975 mg  975 mg Oral On Call to OR Chad Osborne PA-C        BUPivacaine liposome (EXPAREL) 1.3 % injection 66.5 mg  5 mL SubCUTAneous Once Chad Osborne PA-C        dexAMETHasone (DECADRON) injection 8 mg  8 mg IntraVENous On Call to OR Chad Osborne PA-C        pregabalin (LYRICA) capsule 75 mg  75 mg Oral BID Chad Osborne PA-C        tamsulosin

## 2024-02-12 NOTE — INTERVAL H&P NOTE
Update History & Physical    The patient's History and Physical of February 12, 2024 was reviewed with the patient and I examined the patient. There was no change. The surgical site was confirmed by the patient and me.     Plan: The risks, benefits, expected outcome, and alternative to the recommended procedure have been discussed with the patient. Patient understands and wants to proceed with the procedure.     Electronically signed by RICHIE BELCHER MD on 2/12/2024 at 9:10 AM

## 2024-02-12 NOTE — DISCHARGE INSTRUCTIONS
surgery. Take as directed until all tablets are taken.  ·   Walking regularly every day helps decrease the risk of blood clots!     COMPRESSION STOCKINGS      Wear knee-high compression stockings during the day as needed until swelling resolves. Remove stockings at bedtime.     You may use an ace wrap instead of stockings to help with swelling. Apply the ace wrap from middle of calf to middle of thigh.     WOUND DRESSING INSTRUCTIONS     Please do not remove the bandage. Keep in place until follow-up appointment 10-14 days after surgery.        SHOWERING  ·   It is OK to shower ONLY if incision remains dry.  ·   You may shower with the outer dressing in place, immediately following surgery. Make sure the edges are sealed to avoid getting the wound wet. If becomes wet under bandage, or more than 65% drainage on dressing please contact clinic.      After bandage is removed (at 2 week postoperative visit), you may continue to shower, no tubs or submersion in water  ·   Do not wash or scrub the incision for 6 weeks.  ·   Do not apply topical antibiotics, creams, lotions, ointments, etc. to the incision for 6 weeks post-op.     Do not submerge in water (pool/tub/bath/ocean/lake/etc.) until 4-6 weeks after surgery or until the incision has healed with no scabs remaining.     ICE  ·   Inflammation in the operative leg is normal for several months after surgery.  ·   Use ice several times a day for 30 minutes at a time if ice pack or continuously if using the Ice circulating machine.      Do not apply heat to the wound.     PROCEDURES     Any elective dental cleaning/procedure or invasive procedures like a colonoscopy should not be performed within 6 months following a joint replacement.  ·   You must take antibiotic prophylaxis before any dental cleaning or other invasive procedure.     DRIVING  ·   Necessary travel only for 6 weeks.  ·   You should not drive until you are able to walk WITHOUT a walker (a cane is OK) and

## 2024-02-12 NOTE — NURSE NAVIGATOR
Patient has been cleared safe for discharge by PT/OT. She will discharge home with home physical therapy. Patient has voided on her own. She may discharge at this time.   
replacement before and has no questions or concerns at this time. Will continue to follow until discharge and then postoperatively.

## 2024-02-12 NOTE — PERIOP NOTE
Patient /Family /Designee has been informed that [facility] is not responsible for patient belongings per policy and the signed [facility] Patient Agreement document.  Personal items should be sent home or checked in with security.  Patient /Family /Designee selected the following action:                            [x]  Send personal items home with a family member or friend                                                 []  Check in personal items with security, excluding clothing                            []  Maintain personal items at the bedside, against recommendation                                 by Diego Kumar [facility]                                   ** If patient /family /designee chooses to maintain personal items at the bedside,                                      Complete the patient belongings inventory in the EMR.

## 2024-02-12 NOTE — HOME CARE
Received home health referral for [facility] for PT,Baldev Knee protocol ; Discharge order noted for today; spoke to patient in room, Verified demographics,explained HH services ,answered all questions and provided patient with [facility] contact card ; Patient states her 3 daughters will be assisting her at home after discharge;  Patient states she  has DME: RW and shower chair ; HH referral processed to [facility] central Intake and scheduling .VADIM CASTELAN.

## 2024-02-12 NOTE — DISCHARGE SUMMARY
2/12/2024  7:14 AM    2/12/2024, 12:14 PM    Primary Dx:right Orthopedic / Rheumatologic: Total Knee Replacement  Secondary Dx: Etiological Diagnoses: none    HPI:  Pt has end stage OA of their right knee and had failed conservative treatment.  Due to the current findings and affected activity of daily living surgical intervention is indicated.  The alternatives, risks, complications as well as expected outcome were discussed, the patient understands and wishes to proceed with surgery    Past Medical History:   Diagnosis Date    DVT (deep venous thrombosis) (HCC) 2003    History of pulmonary embolus (PE) 12/2017    bilateral    Hx of blood clots          Current Facility-Administered Medications:     lactated ringers IV soln infusion, , IntraVENous, Continuous, Steve Paredes MD, Last Rate: 125 mL/hr at 02/12/24 0850, Restarted at 02/12/24 1007    BUPivacaine liposome (EXPAREL) 1.3 % injection 66.5 mg, 5 mL, SubCUTAneous, Once, Chad Sanderson PA-C    pregabalin (LYRICA) capsule 75 mg, 75 mg, Oral, BID, Chad Sanderson PA-C, 75 mg at 02/12/24 0852    tamsulosin (FLOMAX) capsule 0.4 mg, 0.4 mg, Oral, Daily, Chad Sanderson PA-C, 0.4 mg at 02/12/24 0852    sod chloride IRR soln 0.9 % 1,000 mL with neomycin-polymyxin B (NEOSPORIN) 500,000 Units, vancomycin (VANCOCIN) 1,000 mg irrigation, , , PRN, Ramesh De Paz MD, Given at 02/12/24 1032    povidone-iodine 5 % ophthalmic solution, , , PRN, Ramesh De Paz MD, 17.5 mL at 02/12/24 1032    sodium chloride (PF) 0.9 % 50 mL with BUPivacaine (MARCAINE) 0.5 % 25 mL, BUPivacaine liposome (EXPAREL) 20 mL, EPINEPHrine 0.5 mg, ketorolac (TORADOL) 30 MG/ML 30 mg, , , PRN, Ramesh De Paz MD, 97.5 mL at 02/12/24 1058    sod chloride IRR soln 0.9 % 250 mL irrigation, , , PRN, Ramesh De Paz MD, Given at 02/12/24 1032    sod chloride IRR soln 0.9 % 250 mL irrigation, , , PRN, Ramesh De Paz MD, Given at 02/12/24 1032    Facility-Administered Medications Ordered in Other

## 2024-02-12 NOTE — PROGRESS NOTES
OCCUPATIONAL THERAPY EVALUATION/DISCHARGE    Patient: Aleida Magdaleno (53 y.o. female)  Date: 2/12/2024  Primary Diagnosis: Osteoarthritis of right knee, unspecified osteoarthritis type [M17.11]  Arthritis of knee [M17.10]  Procedure(s) (LRB):  RIGHT TOTAL KNEE REPLACEMENT (Right) Day of Surgery   Precautions: Fall Risk,  PLOF: Patient was independent with basic self care tasks and used no AD for functional mobility PTA.      ASSESSMENT AND RECOMMENDATIONS:  Patient is able to perform basic self care tasks without assistance while seated and in standing.  Supervision given for functional mobility/transfers.  Patient has a supportive daughter at home to assist her prn and all needed DME for home safety.  She was left seated in the recliner with all needs met at the end of the session.      Maximum therapeutic gains met at current level of care and patient will be discharged from occupational therapy at this time.    Further Equipment Recommendations for Discharge:  NA, patient has all DME    AMPAC: AM-PAC Inpatient Daily Activity Raw Score: 24    At this time and based on an AM-PAC score, no further OT is recommended upon discharge.  Recommend patient returns to prior setting with prior services.    This [facility] score should be considered in conjunction with interdisciplinary team recommendations to determine the most appropriate discharge setting. Patient's social support, diagnosis, medical stability, and prior level of function should also be taken into consideration.     SUBJECTIVE:   Patient stated “I had my other knee done 4 months ago.”    OBJECTIVE DATA SUMMARY:     Past Medical History:   Diagnosis Date    DVT (deep venous thrombosis) (HCC) 2003    History of pulmonary embolus (PE) 12/2017    bilateral    Hx of blood clots      Past Surgical History:   Procedure Laterality Date    CHOLECYSTECTOMY      GASTRIC BYPASS SURGERY      TOTAL KNEE ARTHROPLASTY Left 10/11/2023    LEFT TOTAL KNEE ARTHROPLASTY;

## 2024-02-13 ENCOUNTER — HOME CARE VISIT (OUTPATIENT)
Age: 54
End: 2024-02-13
Payer: COMMERCIAL

## 2024-02-13 ENCOUNTER — TELEPHONE (OUTPATIENT)
Age: 54
End: 2024-02-13

## 2024-02-13 PROCEDURE — G0151 HHCP-SERV OF PT,EA 15 MIN: HCPCS

## 2024-02-13 NOTE — OP NOTE
balance, patellar tracking, stability, and alignment, all of which we were delighted with.  Fashioned a bone plug for the femoral canal and cemented in the knee removing all extraneous cement and holding the knee in full extension.  The cement was fully cured.  Further cement removal, pulse lavage, and trialing.  I was very happy with it.  I inserted the final bearing.  Let the tourniquet down.  Dilute Betadine protocol.    An excellent outcome to a difficult case in a patient with significant obesity much higher than her BMI would indicate, very unstable knee secondary to morbid obesity over the years with very stretched out collateral ligaments, Osgood-Schlatter disease, and also internal femoral torsion requiring advanced ligamentous balancing techniques.  Very careful attention to femoral rotation and had to be very careful with her components and bony cuts due to her soft bone and also instability.  Had to be quite conservative with her.  Overall, an excellent outcome to a difficult case.  I was thrilled with the result.  The patient was brought to Recovery in stable condition without complication.      RICHIE BELCHER MD      [id number]  D:  02/12/2024 11:58  T:  02/12/2024 19:47  JOB #:  [id number]

## 2024-02-13 NOTE — TELEPHONE ENCOUNTER
Called patient and relayed message, patient verbalized understanding with intent to comply.    Advised not to exceed 8785-1192 mg of acetaminophen per day.

## 2024-02-13 NOTE — TELEPHONE ENCOUNTER
Post op patient wants to know can she take tylenol on her own along with the medication that you prescribe to her  or can you prescribe something stronger than oxycodne 5mg. Because the last time the oxycodone was 7.5 with tylenol.    Patient wants to use [facility] on [address] [facility]    Patient requesting a call back at [phone]

## 2024-02-13 NOTE — HOME HEALTH
No SQA strengthening.  Skilled services/Home bound verification:     Skilled Reason for admission/summary of clinical condition:  As per hospital d/c summary, patient is a 52 y/o female with PMhx of DVT (deep venous thrombosis) (HCC) 2003 History of pulmonary embolus (PE) 12/2017 bilateral Hx of blood clots underwent R TOTAL KNEE ARTHROPLASTY on 2/12/24 by  Ramesh De Paz MD  and was d/c to home on 2/12/24.    This patient is homebound for the following reasons Requires considerable and taxing effort to leave the home , Requires the assistance of 1 or more persons to leave the home  and only leaves the home for medical reasons or [employer] services and are infrequent and of short duration for other reasons .    Caregiver: Daughter. Discussed with patient and caregiver(s) availability and willingness to provide care.    Primary caregiver is available occasional short-term daytime, regular nighttime and is able to assist with meal prep and setup, medication management, perform wound care, grocery shopping, household chores, transportation to MD appointment, transferring from bed to chair and transferring to assistive device.  .    Medications reconciled and all medications are not available in the home this visit. Naloxone not present during this visit. Patient to call pharmacy today.  The following education was provided regarding medications: Oxycodone, Aspirin, Cefadroxil  Medications  are effective at this time.     High risk medication teaching regarding anticoagulants, hyperglycemic agents or opioid narcotics performed (specify) Oxycodone- Instructed patient/caregiver to take exact amount of narcotics prescribed, signs and symptoms of oversedation, notify SN/PT if over sedated.  May cause constipation, notify SN/PT if no BM x 3 days.   Aspirin- Instructed patient/caregiver to notify SN/PT of any signs and symptoms of antiplatelet adverse effects including SOB, bleeding longer/heavier with cuts, bruising, nose

## 2024-02-14 VITALS
DIASTOLIC BLOOD PRESSURE: 90 MMHG | HEART RATE: 59 BPM | TEMPERATURE: 98.2 F | OXYGEN SATURATION: 98 % | SYSTOLIC BLOOD PRESSURE: 148 MMHG | RESPIRATION RATE: 16 BRPM

## 2024-02-15 ENCOUNTER — HOME CARE VISIT (OUTPATIENT)
Age: 54
End: 2024-02-15
Payer: COMMERCIAL

## 2024-02-15 ENCOUNTER — TELEPHONE (OUTPATIENT)
Age: 54
End: 2024-02-15

## 2024-02-15 DIAGNOSIS — G89.18 POST-OP PAIN: Primary | ICD-10-CM

## 2024-02-15 PROCEDURE — G0151 HHCP-SERV OF PT,EA 15 MIN: HCPCS

## 2024-02-15 NOTE — TELEPHONE ENCOUNTER
Patient called in and states she will be out of her Oxycodone on Saturday and would like to know if she can get a refill on the prescription and sent in to the pharmacy listed below.        [facility]        Please call and advise patient at   [phone]

## 2024-02-16 ENCOUNTER — HOME CARE VISIT (OUTPATIENT)
Age: 54
End: 2024-02-16
Payer: COMMERCIAL

## 2024-02-16 PROCEDURE — G0151 HHCP-SERV OF PT,EA 15 MIN: HCPCS

## 2024-02-16 RX ORDER — OXYCODONE AND ACETAMINOPHEN 7.5; 325 MG/1; MG/1
1-2 TABLET ORAL EVERY 8 HOURS PRN
Qty: 42 TABLET | Refills: 0 | Status: SHIPPED | OUTPATIENT
Start: 2024-02-18 | End: 2024-02-25

## 2024-02-17 ENCOUNTER — HOME CARE VISIT (OUTPATIENT)
Age: 54
End: 2024-02-17
Payer: COMMERCIAL

## 2024-02-17 PROCEDURE — G0157 HHC PT ASSISTANT EA 15: HCPCS

## 2024-02-18 ENCOUNTER — HOME CARE VISIT (OUTPATIENT)
Age: 54
End: 2024-02-18
Payer: COMMERCIAL

## 2024-02-18 ENCOUNTER — TELEPHONE (OUTPATIENT)
Age: 54
End: 2024-02-18

## 2024-02-18 VITALS
HEART RATE: 77 BPM | TEMPERATURE: 98.1 F | OXYGEN SATURATION: 98 % | DIASTOLIC BLOOD PRESSURE: 87 MMHG | SYSTOLIC BLOOD PRESSURE: 140 MMHG

## 2024-02-18 PROCEDURE — G0157 HHC PT ASSISTANT EA 15: HCPCS

## 2024-02-19 ENCOUNTER — HOME CARE VISIT (OUTPATIENT)
Age: 54
End: 2024-02-19
Payer: COMMERCIAL

## 2024-02-19 VITALS
OXYGEN SATURATION: 98 % | DIASTOLIC BLOOD PRESSURE: 80 MMHG | SYSTOLIC BLOOD PRESSURE: 120 MMHG | RESPIRATION RATE: 16 BRPM | OXYGEN SATURATION: 99 % | TEMPERATURE: 98.2 F | HEART RATE: 68 BPM | TEMPERATURE: 98 F | SYSTOLIC BLOOD PRESSURE: 136 MMHG | HEART RATE: 79 BPM | DIASTOLIC BLOOD PRESSURE: 74 MMHG

## 2024-02-19 VITALS
HEART RATE: 67 BPM | RESPIRATION RATE: 15 BRPM | DIASTOLIC BLOOD PRESSURE: 78 MMHG | TEMPERATURE: 98.2 F | OXYGEN SATURATION: 99 % | SYSTOLIC BLOOD PRESSURE: 124 MMHG

## 2024-02-19 PROCEDURE — G0157 HHC PT ASSISTANT EA 15: HCPCS

## 2024-02-19 ASSESSMENT — ENCOUNTER SYMPTOMS
PAIN LOCATION - PAIN QUALITY: ACHY, BURNING
PAIN LOCATION - PAIN QUALITY: ACHY, BURNING
PAIN LOCATION - PAIN QUALITY: SORENESS, TIGHTNESS

## 2024-02-19 NOTE — HOME HEALTH
SUBJECTIVE: Patient reported having better sleep compare the night before. Patient stated being able to get in and out of bed without assistance by scooting all the way back.  Patient denies fall since evaluation.  .  CAREGIVER INVOLVEMENT/ASSISTANCE NEEDED FOR: Daughter present during this visit.  .  OBJECTIVE:  See interventions.  PATIENT EDUCATION PROVIDED THIS VISIT: HEP, swellling management, pain management, high risk med education, s/s of infection.  PATIENT RESPONSE TO EDUCATION PROVIDED: Patient able to teach back information provided.  PATIENT RESPONSE TO TREATMENT: Patient denies increased pain on R knee post treatment session today. Patient reported pain scale at 6/10.  .  ASSESSMENT OF PROGRESS TOWARD GOALS: Patient progressing towards goals as evidenced by performing bed mobility skills independently. Patient however continues to demonstrates weakness on R LE as evidenced by patient unable to perform R SLR and LAQ.  .  PLAN FOR NEXT VISIT: R LE strengthening, R knee ROM exercises, safety in functional mobility skills using FWW.  .  THE FOLLOWING DISCHARGE PLANNING WAS DISCUSSED WITH THE PATIENT/CAREGIVER: Discharge to self, family and under the supervision of MD once patient has met all goals or reached maximum potential.

## 2024-02-19 NOTE — HOME HEALTH
SUBJECTIVE: Patient reported able to tolerate placing R foot down on the floor better when walking.  .  CAREGIVER INVOLVEMENT/ASSISTANCE NEEDED FOR: Daughter present during this visit.  .  OBJECTIVE:  See interventions.  PATIENT EDUCATION PROVIDED THIS VISIT: HEP, fall prevention  PATIENT RESPONSE TO EDUCATION PROVIDED: Patient able to teach back HEP which includes supine ankle pumps x 20-30reps, ankle circles x 10-15 reps, knee press downs x 5 seconds hold x 10-15 reps; heel slides x 5 seconds hold x 10-15 reps, assisted SLR x 5-10 reps, seated kickout with assist x 5 seconds hold x 10-15 reps, seated knee bends x 5 seconds x 10-15 reps, supported weight shifting exercises ( forward/backward), heel/toe raises x 10 reps  followed by ice packs on R knee  x 20 mins.     PATIENT RESPONSE TO TREATMENT: Patient denies increased pain on R knee post treatment session today.  .  ASSESSMENT OF PROGRESS TOWARD GOALS: Patient progressing towards goals as evidenced by demonstrating 0-92 degrees R knee ROM with c/o 6/10 pain at end range compared to 0-42 degrees during initial evaluation.  .  PLAN FOR NEXT VISIT: R LE strengthening, R knee ROM exercises, balance training and safety in functional mobility skills.  .  THE FOLLOWING DISCHARGE PLANNING WAS DISCUSSED WITH THE PATIENT/CAREGIVER: Discharge to self, family and under the supervision of MD once patient has met all goals or reached maximum potential.

## 2024-02-19 NOTE — HOME HEALTH
Subjective: Patient relays that she has been completing her given HEP and elevating her LE as instructed. She states that overall her pain is improving and that she overall is doing better after this surgery than her last one around 4 months ago.     Objective: Skilled home health physical therapy interventions completed today listed in care plan section.  Interventions performed today to assist in return to prior level of function, address deficits as apparent upon time of initial evaluation, return to community and personal activities, and progress further towards goals as previously established in plan of care. There are not any changes to medications upon timing of this visit. Home health supplies were not ordered/delivered today. Visual inspection finds dressing over anterior knee incision with moderate drainage noted into dressing with superior border starting to come away from skin. Applied paper tape to reinforce dressing integrity overall. Molly's sign negative. Right LE edema noted non-pitting grade 2-/3. Gentle effleurage to right ankle and lower leg. Review of management of LE edema including appropriate positioning for LE elevation.    Assessment:  Patient is progressing towards goals as previously established in POC with skilled home health physical therapy services at this time as made apparent by improving overall mobility with ability to ambulate around the home and complete bed mobility with less assistance needed. She was able to display improved hip flexion during swing phase of gait following training provided for gait cycle corrections. Family/caregiver daughter and friend involvement is present/set-up at this point in time and assists with meals, transport, and general encouragement. No suspected post surgical infection nor DVT at this point in time. Patient reported decreased pain post effleurage. She is able to repeat back appropriate form with LE elevation to better manage edema.     Plan:

## 2024-02-20 ENCOUNTER — HOME CARE VISIT (OUTPATIENT)
Age: 54
End: 2024-02-20
Payer: COMMERCIAL

## 2024-02-21 ENCOUNTER — HOME CARE VISIT (OUTPATIENT)
Age: 54
End: 2024-02-21
Payer: COMMERCIAL

## 2024-02-21 ASSESSMENT — ENCOUNTER SYMPTOMS: PAIN LOCATION - PAIN QUALITY: ACHE

## 2024-02-22 ENCOUNTER — HOME CARE VISIT (OUTPATIENT)
Age: 54
End: 2024-02-22
Payer: COMMERCIAL

## 2024-02-22 PROCEDURE — G0157 HHC PT ASSISTANT EA 15: HCPCS

## 2024-02-22 NOTE — HOME HEALTH
SUBJECTIVE:Pt reports she continues to have alot of stiffness and edema in RLE but the tenderness in R lower leg has been subsiding.  CAREGIVER INVOLVEMENT/ASSISTANCE NEEDED FOR:Pts dtrs assist pt with all needs prn but not present during PT session.    OBJECTIVE:  See interventions.  PATIENT EDUCATION PROVIDED THIS VISIT: Pt instructed to continue HEP 2x/day and amb with FWW in home as tolerated.  Pt may use cold pac to R knee prn 20 minutes/every hour for pain/edema management.  PATIENT RESPONSE TO EDUCATION PROVIDED: Pt reports she uses cold pac frequently but concerned she still has edema in RLE.  PATIENT RESPONSE TO TREATMENT: Pt requires frequent rest breaks throughout PT session secondary to c/o fatigue and stiffness.  .  ASSESSMENT OF PROGRESS TOWARD GOALS: Pt requires cuing to focus on increase R knee flex and heel/toe gait pattern during LE advancement.  Pt reports a fair compliance with HEP and has been limited secondary to edema in LE.  Negative sarah beth sign.  No redness/warmth noted.  Changed dressing with mepilex dressing and incision intact.  .  PLAN FOR NEXT VISIT: Will plan to continue working towards improving LE strength/ROM and transfer/bed mobility.  THE FOLLOWING DISCHARGE PLANNING WAS DISCUSSED WITH THE PATIENT/CAREGIVER: pt is scheduled to continue PT through 2/26/24 and then 1w1.

## 2024-02-23 ENCOUNTER — HOME CARE VISIT (OUTPATIENT)
Age: 54
End: 2024-02-23
Payer: COMMERCIAL

## 2024-02-23 ENCOUNTER — TELEPHONE (OUTPATIENT)
Age: 54
End: 2024-02-23

## 2024-02-23 DIAGNOSIS — S82.001A CLOSED DISPLACED FRACTURE OF RIGHT PATELLA, UNSPECIFIED FRACTURE MORPHOLOGY, INITIAL ENCOUNTER: ICD-10-CM

## 2024-02-23 DIAGNOSIS — G89.18 POST-OP PAIN: Primary | ICD-10-CM

## 2024-02-23 PROCEDURE — G0157 HHC PT ASSISTANT EA 15: HCPCS

## 2024-02-23 RX ORDER — OXYCODONE AND ACETAMINOPHEN 7.5; 325 MG/1; MG/1
1-2 TABLET ORAL EVERY 8 HOURS PRN
Qty: 42 TABLET | Refills: 0 | Status: SHIPPED | OUTPATIENT
Start: 2024-02-23 | End: 2024-03-01

## 2024-02-23 RX ORDER — NALOXONE HYDROCHLORIDE 4 MG/.1ML
1 SPRAY NASAL PRN
Qty: 1 EACH | Refills: 1 | Status: SHIPPED | OUTPATIENT
Start: 2024-02-23

## 2024-02-23 NOTE — TELEPHONE ENCOUNTER
Post Op Patient called for YOVANY Sanderson.     Patient said that the pharmacy told her that a Prior Auth is needed for the Oxycodone medication.    Patient said that the pharmacy told her that if YOVANY Sanderson puts a anglin on the Prior Auth, she will get the medication within 24 hours.     [facility]  Tel. [phone]    Patient tel. [phone]    Note : patient see YOVANY Sanderson for the Right Knee.

## 2024-02-23 NOTE — TELEPHONE ENCOUNTER
Patient is requesting a update on the previous message that was sent earlier today, 2/23/24 because she will be out of her medication by tomorrow.    Patient tel:[phone]

## 2024-02-23 NOTE — TELEPHONE ENCOUNTER
Patient is requesting a refill of Oxycodone-Acetaminophen 7.5-325.    Pharmacy:    [id number] - [place], [place] - [address] - P [phone] - F [phone] [[id number]]

## 2024-02-24 ENCOUNTER — HOME CARE VISIT (OUTPATIENT)
Age: 54
End: 2024-02-24
Payer: COMMERCIAL

## 2024-02-24 VITALS
HEART RATE: 80 BPM | DIASTOLIC BLOOD PRESSURE: 78 MMHG | SYSTOLIC BLOOD PRESSURE: 136 MMHG | OXYGEN SATURATION: 98 % | TEMPERATURE: 98.5 F | RESPIRATION RATE: 17 BRPM

## 2024-02-24 PROCEDURE — G0157 HHC PT ASSISTANT EA 15: HCPCS

## 2024-02-24 ASSESSMENT — ENCOUNTER SYMPTOMS
PAIN LOCATION - PAIN QUALITY: ACHE
PAIN LOCATION - PAIN QUALITY: ACHE

## 2024-02-24 NOTE — HOME HEALTH
Pt declined PT today secondary to pts family member she is POA for in hospital.  Will plan to resume PT tomorrow.

## 2024-02-24 NOTE — HOME HEALTH
Pt agreed to PT visit but then called to cancel PT visit secondary to family emergency.  Will plan to attempt PT again tomorrow.

## 2024-02-24 NOTE — HOME HEALTH
SUBJECTIVE:Pt reports she has been doing her exercises at hospital and trying to walk as much as she can.    CAREGIVER INVOLVEMENT/ASSISTANCE NEEDED FOR: pts dtr and son assist pt with needs prn but not present during PT session.  .  OBJECTIVE:  See interventions.  PATIENT EDUCATION PROVIDED THIS VISIT: Instructed pt on proper technique for light effleurage retrograde massage to R calf/politeal area to decrease edema/tightness.  Instructed pt to continue HEP 2x/day and amb as tolerated with FWW.  PATIENT RESPONSE TO EDUCATION PROVIDED: Pt demonstrates good teach back technique with massage and reports a good compliance with HEP.  PATIENT RESPONSE TO TREATMENT: Pt requires occ rest breaks secondary to c/o fatigue.  .  ASSESSMENT OF PROGRESS TOWARD GOALS: Pts R knee ROM in supine 0-88* today.  Pt continues to have a guarded posture during amb with decreased R knee flex and heel/toe gait pattern with cuing to correct.  Pt demonstrates improved gait pattern after performing exercises.  Pt demonstrates improved strength in RLE as evidence by pt able to perform SLR x 5 with initial assist to raise RLE.  .  PLAN FOR NEXT VISIT: Will plan to attempt stairs/outdoor amb next visit and attempt cane amb.  THE FOLLOWING DISCHARGE PLANNING WAS DISCUSSED WITH THE PATIENT/CAREGIVER: Pt is scheduled to continue PT daily through 2/26/24 then 1w1 with dc at end of next week.

## 2024-02-25 ENCOUNTER — HOME CARE VISIT (OUTPATIENT)
Age: 54
End: 2024-02-25
Payer: COMMERCIAL

## 2024-02-25 PROCEDURE — G0157 HHC PT ASSISTANT EA 15: HCPCS

## 2024-02-26 ENCOUNTER — HOME CARE VISIT (OUTPATIENT)
Age: 54
End: 2024-02-26
Payer: COMMERCIAL

## 2024-02-26 VITALS
HEART RATE: 75 BPM | OXYGEN SATURATION: 99 % | SYSTOLIC BLOOD PRESSURE: 136 MMHG | OXYGEN SATURATION: 98 % | HEART RATE: 63 BPM | SYSTOLIC BLOOD PRESSURE: 128 MMHG | TEMPERATURE: 98.6 F | RESPIRATION RATE: 15 BRPM | RESPIRATION RATE: 14 BRPM | DIASTOLIC BLOOD PRESSURE: 90 MMHG | DIASTOLIC BLOOD PRESSURE: 72 MMHG | TEMPERATURE: 98.5 F

## 2024-02-26 PROCEDURE — G0151 HHCP-SERV OF PT,EA 15 MIN: HCPCS

## 2024-02-26 NOTE — TELEPHONE ENCOUNTER
Patient called today stating that neither her insurance or the pharmacy had record of our prior auth noted below and they could only provide her a 3 day supply of the medication this past Saturday.  She was told that we now need to write a new prescription in order for her to receive the rest of the medication.  Is there any way we can assist with her getting the medication from [facility] on [address] without starting the process over again?  Please review and advise patient at [phone].    She has enough for today only.

## 2024-02-26 NOTE — TELEPHONE ENCOUNTER
Please check to see how much she received from the pharmacy.  If only 3 day supply can refill rx tomorrow.

## 2024-02-26 NOTE — TELEPHONE ENCOUNTER
Patient called again stating pharmacy does not have her prescription yet, advised her that notes indicate she should be able to fill tomorrow.  Please ensure patients prescription will be called in for her to fill 2/27.

## 2024-02-26 NOTE — TELEPHONE ENCOUNTER
Patient called and confirmed that she was given a 3 day supply of Oxycodone.     Patient can be reached at [phone].

## 2024-02-27 VITALS
OXYGEN SATURATION: 99 % | TEMPERATURE: 98.2 F | HEART RATE: 83 BPM | DIASTOLIC BLOOD PRESSURE: 85 MMHG | SYSTOLIC BLOOD PRESSURE: 128 MMHG

## 2024-02-27 RX ORDER — OXYCODONE HYDROCHLORIDE 5 MG/1
5-10 TABLET ORAL EVERY 8 HOURS PRN
Qty: 42 TABLET | Refills: 0 | Status: SHIPPED | OUTPATIENT
Start: 2024-02-27 | End: 2024-03-05

## 2024-02-27 NOTE — HOME HEALTH
Subjective: Patient relays that she has been able to work on her gait form and feels that she has been doing better with her overall gait form. She states that she did have some remaining soreness after last visit but it did not persist.     Objective: Skilled home health physical therapy interventions completed today listed in care plan section.  Interventions performed today to assist in return to prior level of function, address deficits as apparent upon time of initial evaluation, return to community and personal activities, and progress further towards goals as previously established in plan of care. There are not any changes to medications upon timing of this visit. Home health supplies were not ordered/delivered today. Visual inspection finds dressing intact over anterior knee incision with no marked drainage noted into dressing. Manual stretching gastrocnemius right LE. Soft tissue mobilization training right hamstrings, gastrocnemius, and iliotibial band. Right patellar glides medial/lateral grade 2/3.     Assessment:  Patient is progressing towards goals as previously established in POC with skilled home health physical therapy services at this time as made apparent by improving overall ability to complete exercises including standing step gastrocnemius stretch off step, mini squats, and 3-way functionals today.. Family/caregiver daughter involvement is present/set-up at this point in time and assists with meals, transport, and general encouragement. Displaying knee range of motion improvements overall. Mild fatigue noted upon treatment completion today.     Plan:  Discharge planning discussed at this visit regarding eventual discharge once patient has met goals and/or met maximum benefit from home health skilled PT services with patient understanding and agreeable at this time. Continued need for skilled home health PT at this time to address deficits, reduce risk of falls, and obtain goals as previously

## 2024-02-27 NOTE — TELEPHONE ENCOUNTER
Patient is stating that her pharmacy still has not received the Prior Authorization for her Oxycodone.     Patient tel:[phone]    [facility] on [facility]

## 2024-02-27 NOTE — HOME HEALTH
SUBJECTIVE: Pt reports she has been amb outside with daughter to the car    CAREGIVER INVOLVEMENT/ASSISTANCE NEEDED FOR: patient's adult children assist with all needs prn   OBJECTIVE: See interventions.   PATIENT EDUCATION PROVIDED THIS VISIT: advised to increase HEP freq to 3X per day and amb with FWW in home every hour. Cold pac to R knee prn < 20 minutes/hr for pain/edema management using skin barrier. Educated patient to elevate RLE with several pillow while in bed to decrease edema. Educated to use cane for short distance in the house, but advised to use RW if increased pain, stiffness  PATIENT RESPONSE TO EDUCATION PROVIDED: Pt reports she feels confident to use cane in the house for short distances in the house  PATIENT RESPONSE TO TREATMENT: Pt requires tactile cues for correct form during HEP to activate muscle throughout PT session   ASSESSMENT OF PROGRESS TOWARD GOALS: Outdoor amb deferred d/t patient stated amb with daughter of the weekend Dressing changed R knee: staples intact, no drainage noted, no redness, not s/s of infection. picture uploaded inchart/media. eaPt has incrsed ROM in R knee from 90* at start of session to 97* at end of session measured in supine. Pt began amb with SC in home with cuing for proper cane placement and gait sequencing. Pt demonstrated increase R knee flex during amb with FWW today but had a rigid RLE posture with cane. .  PLAN FOR NEXT VISIT: discharge plan discussed w patient, and dc scheduled with evaluating PT after ortho f/u on Wednesday  THE FOLLOWING DISCHARGE PLANNING WAS DISCUSSED WITH THE PATIENT/CAREGIVER: Pt is scheduled to dc on 29 Feb2024

## 2024-02-27 NOTE — HOME HEALTH
Subjective: Patient relays that she has been completing her given HEP as instructed and feels like she is making progress faster with this surgical recovery compared to her last one.     Objective: Skilled home health physical therapy interventions completed today listed in care plan section.  Interventions performed today to assist in return to prior level of function, address deficits as apparent upon time of initial evaluation, return to community and personal activities, and progress further towards goals as previously established in plan of care. There are not any changes to medications upon timing of this visit. Home health supplies were not ordered/delivered today. Visual inspection finds dressing intact with no marked drainage into dressing anterior right knee. Manual gastrocnemius stretching gentle overpressure. Trained in soft tissue mobilization to iliotibial band and peroneus longus right LE with trial.     Assessment:  Patient is slowly progressing towards goals as previously established in POC with skilled home health physical therapy services at this time as made apparent by improving overall mobility reports and ability to complete harder therapeutic exercises displaying improving LE strength and ROM. She was able to complete all bed mobility today with no cueing/training needed for form/safety corrections. She was able to display improved hip flexion during swing phase of gait following cueing/training for gait cycle improvements. Was able to complete standing exercises with no noted loss of balance during completion today. Family/caregiver daughter involvement is present/set-up at this point in time and assists with meals, transport, and general encouragement.     Plan:  Discharge planning discussed at this visit regarding eventual discharge once patient has met goals and/or met maximum benefit from home health skilled PT services with patient understanding and agreeable at this time. Continued

## 2024-02-28 ENCOUNTER — OFFICE VISIT (OUTPATIENT)
Age: 54
End: 2024-02-28

## 2024-02-28 DIAGNOSIS — Z48.89 ENCOUNTER FOR POSTOPERATIVE CARE: ICD-10-CM

## 2024-02-28 DIAGNOSIS — Z48.02 ENCOUNTER FOR STAPLE REMOVAL: ICD-10-CM

## 2024-02-28 DIAGNOSIS — M25.461 SWELLING OF KNEE JOINT, RIGHT: ICD-10-CM

## 2024-02-28 DIAGNOSIS — Z96.651 PRESENCE OF RIGHT ARTIFICIAL KNEE JOINT: Primary | ICD-10-CM

## 2024-02-28 PROCEDURE — 99024 POSTOP FOLLOW-UP VISIT: CPT | Performed by: PHYSICIAN ASSISTANT

## 2024-02-28 NOTE — PROGRESS NOTES
instructed to stop her aspirin.  She will continue on her pain medicine and Celebrex.  She will continue ice therapy.  She will be set up with outpatient physical therapy.  She is instructed on range of motion activities on hourly basis at home.  We will see her back in 2 weeks time for reevaluation and range of motion check.    Care plan outlined and precautions discussed. Radiographs and Results were reviewed with the patient.  Medications were reviewed with the patient. All of pt's questions and concerns were addressed.  Increasing symptoms and return precautions associated with chief complaint and evaluation were reviewed with the patient in detail.  The patient demonstrated adequate understanding.  Social determinents of health were discussed and did not alter treatment plan.            JR Kin HECK, PATimboC, ATC

## 2024-02-29 ENCOUNTER — HOME CARE VISIT (OUTPATIENT)
Age: 54
End: 2024-02-29
Payer: COMMERCIAL

## 2024-02-29 PROCEDURE — G0151 HHCP-SERV OF PT,EA 15 MIN: HCPCS

## 2024-02-29 NOTE — TELEPHONE ENCOUNTER
Received confirmation that her medication has been approved at this time.       Attempted to contact pt at her home number to inform her that the medication was approved at this time.

## 2024-03-04 ENCOUNTER — TELEPHONE (OUTPATIENT)
Age: 54
End: 2024-03-04

## 2024-03-04 VITALS
SYSTOLIC BLOOD PRESSURE: 136 MMHG | DIASTOLIC BLOOD PRESSURE: 86 MMHG | TEMPERATURE: 98.9 F | HEART RATE: 88 BPM | RESPIRATION RATE: 14 BRPM | OXYGEN SATURATION: 98 %

## 2024-03-04 DIAGNOSIS — G89.18 POST-OP PAIN: Primary | ICD-10-CM

## 2024-03-04 RX ORDER — OXYCODONE HYDROCHLORIDE 5 MG/1
5-10 TABLET ORAL EVERY 8 HOURS PRN
Qty: 42 TABLET | Refills: 0 | Status: SHIPPED | OUTPATIENT
Start: 2024-03-04 | End: 2024-03-11

## 2024-03-04 ASSESSMENT — ENCOUNTER SYMPTOMS: PAIN LOCATION - PAIN QUALITY: ACHY

## 2024-03-04 NOTE — TELEPHONE ENCOUNTER
Patient called to request a refill of Oxycodone.        Pharmacy:    [facility]/PHARMACY #[id number] - [place], [place] - [address] - P [phone] - F [phone] [[id number]]

## 2024-03-04 NOTE — TELEPHONE ENCOUNTER
[facility] and provided their fax # [phone].

## 2024-03-04 NOTE — HOME HEALTH
Patient d/c from PT d/t reached goals and reached max rehab potentials at this time. During this visit, patient presents with the following clinical findings:    SUBJECTIVE: Patient c/o constant achy pain on R knee  with highest pain level of 7/10 and least pain level of 5/10 for the past 24 hours. Patient manages pain by taking pain medication as prescribed by MD, application of ice pack, positioning and relaxation.  Patient reported she last took 1 tab of oxycodone this am.  Patient reported she now walk inside home using cane from time to time.  .  CAREGIVER ASSISTANCE: Daughter present during this visit.    MEDICATIONS RECONCILED AND UPDATED: no changes in medications at this time.    WOUND: Surgical incision clean and dry. Wound edges well approximated.    MMT: R hip flex 3+/5   R hip Abd 4-/5   R hip add 4-/5   R knee flex 3+/5  R knee ext. 3+/5   R ankle DF 4/5  L LE WFL   FTSTS: unable. Requires joie UE assistance to complete sit to stand.  compared to R hip flex 2+/5   R hip Abd 2+/5   R hip add 2+/5   R knee flex 2+/5  R knee ext. 2+/5   R ankle DF 3/5  L LE WFL   FTSTS: unable   during initial evaluation / reassessment visit.    ROM: Noted 0-103 degrees R knee ROM with c/o 6/10 pain at end range compared to 0-42 degrees active-assisted R knee ROM with c/o 6/10 pain at end range during initial evaluation / reassessment visit.    BALANCE: Patient scored 22/28 on Tinetti balance and gait test compared to 9/28  during initial evaluation / reassessment visit.       BED MOBILITY: Patient is now indep in bed mobility without cues from minimal assistance in bed mobility for R LE management and intermittent verbal cues for proper techniques during initial evaluation / reassessment visit.    TRANSFERS: Patient is now indep in transfers to and from bed, chair, toilet, and car using cane/FWW without cues from touching assistance in safe transfers to and from bed, chair and toilet using FWW with intermittent verbal

## 2024-03-05 ENCOUNTER — CLINICAL DOCUMENTATION (OUTPATIENT)
Age: 54
End: 2024-03-05

## 2024-03-05 NOTE — PROGRESS NOTES
Patient dropped off Sick Leave Statement for to be completed at hs office and can be reached at [phone] when ready for pickup.

## 2024-03-06 ENCOUNTER — CLINICAL DOCUMENTATION (OUTPATIENT)
Age: 54
End: 2024-03-06

## 2024-03-12 ENCOUNTER — CLINICAL DOCUMENTATION (OUTPATIENT)
Age: 54
End: 2024-03-12

## 2024-03-12 NOTE — PROGRESS NOTES
Patient dropped off [facility] paperwork at [address]  to be filled out and completed. Once completed patient would like to be contacted at [phone]. Form was placed into form box.

## 2024-03-14 ENCOUNTER — TELEPHONE (OUTPATIENT)
Age: 54
End: 2024-03-14

## 2024-03-14 ENCOUNTER — OFFICE VISIT (OUTPATIENT)
Age: 54
End: 2024-03-14

## 2024-03-14 DIAGNOSIS — Z96.651 PRESENCE OF RIGHT ARTIFICIAL KNEE JOINT: Primary | ICD-10-CM

## 2024-03-14 DIAGNOSIS — G89.18 POST-OP PAIN: Primary | ICD-10-CM

## 2024-03-14 PROCEDURE — 99024 POSTOP FOLLOW-UP VISIT: CPT | Performed by: PHYSICIAN ASSISTANT

## 2024-03-14 RX ORDER — OXYCODONE HYDROCHLORIDE 5 MG/1
5-10 TABLET ORAL EVERY 8 HOURS PRN
Qty: 42 TABLET | Refills: 0 | Status: SHIPPED | OUTPATIENT
Start: 2024-03-14 | End: 2024-03-21

## 2024-03-14 NOTE — PROGRESS NOTES
return precautions associated with chief complaint and evaluation were reviewed with the patient in detail.  The patient demonstrated adequate understanding.  Social determinents of health were discussed and did not alter treatment plan.              JR Kin HECK, PATimboC, ATC

## 2024-03-14 NOTE — TELEPHONE ENCOUNTER
Patient requests refill of oxyCODONE (ROXICODONE) 5 MG immediate release tablet sent to [facility] on [address].

## 2024-03-19 ENCOUNTER — TELEPHONE (OUTPATIENT)
Age: 54
End: 2024-03-19

## 2024-03-19 ENCOUNTER — CLINICAL DOCUMENTATION (OUTPATIENT)
Age: 54
End: 2024-03-19

## 2024-03-19 NOTE — TELEPHONE ENCOUNTER
Patient called and would like to know if she can get an order for in home PT no because she is not driving and is having a hard time getting to and from PT.        Please call and advise patient at   [phone]

## 2024-03-20 NOTE — TELEPHONE ENCOUNTER
Spoke with the patient and she has decided to continue with the out patient therapy for now. She ask me to hold off on putting in the order for the HH PT. She has transportation for one day a week and is going to try to arrange something for the other day.  She will let us know if she wants the HH PT.

## 2024-03-21 ENCOUNTER — TRANSCRIBE ORDERS (OUTPATIENT)
Facility: HOSPITAL | Age: 54
End: 2024-03-21

## 2024-03-21 ENCOUNTER — TELEPHONE (OUTPATIENT)
Age: 54
End: 2024-03-21

## 2024-03-21 DIAGNOSIS — G89.18 POST-OP PAIN: Primary | ICD-10-CM

## 2024-03-21 DIAGNOSIS — Z12.31 VISIT FOR SCREENING MAMMOGRAM: Primary | ICD-10-CM

## 2024-03-21 RX ORDER — OXYCODONE HYDROCHLORIDE 5 MG/1
5 TABLET ORAL EVERY 4 HOURS PRN
Qty: 42 TABLET | Refills: 0 | Status: SHIPPED | OUTPATIENT
Start: 2024-03-21 | End: 2024-03-28

## 2024-03-21 NOTE — TELEPHONE ENCOUNTER
Patient requesting refill of oxyCODONE (ROXICODONE) 5 MG immediate release tablet called in to [facility] on [address] [facility] [address].

## 2024-03-28 ENCOUNTER — OFFICE VISIT (OUTPATIENT)
Age: 54
End: 2024-03-28
Payer: COMMERCIAL

## 2024-03-28 DIAGNOSIS — G89.18 POST-OP PAIN: ICD-10-CM

## 2024-03-28 DIAGNOSIS — Z96.651 PRESENCE OF RIGHT ARTIFICIAL KNEE JOINT: Primary | ICD-10-CM

## 2024-03-28 PROCEDURE — 99024 POSTOP FOLLOW-UP VISIT: CPT | Performed by: PHYSICIAN ASSISTANT

## 2024-03-28 PROCEDURE — 73562 X-RAY EXAM OF KNEE 3: CPT | Performed by: PHYSICIAN ASSISTANT

## 2024-03-28 RX ORDER — OXYCODONE HYDROCHLORIDE 5 MG/1
5 TABLET ORAL EVERY 4 HOURS PRN
Qty: 42 TABLET | Refills: 0 | Status: SHIPPED | OUTPATIENT
Start: 2024-03-28 | End: 2024-04-04

## 2024-03-28 NOTE — PROGRESS NOTES
Nasal route as needed for Opioid Reversal, Disp: 1 each, Rfl: 1    ondansetron (ZOFRAN) 4 MG tablet, Take 1 tablet by mouth every 8 hours as needed for Nausea or Vomiting, Disp: 30 tablet, Rfl: 2    celecoxib (CELEBREX) 200 MG capsule, Take 1 capsule by mouth 2 times daily, Disp: 60 capsule, Rfl: 2    Ferrous Sulfate (IRON PO), Take by mouth daily, Disp: , Rfl:     chlorhexidine (PERIDEX) 0.12 % solution, RINSE MOUTH WITH 15ML (1 CAPFUL) FOR 30 SECONDS IN MORNING AND EVENING AFTER MEALS, THEN SPIT, Disp: , Rfl:     BD PEN NEEDLE RODNEY 2ND GEN 32G X 4 MM MISC, USE ONCE DAILY WITH VICTOZA, Disp: , Rfl:     lidocaine (LIDODERM) 5 %, APPLY 1 PATCH AND LEAVE IN PLACE FOR 12 HOURS, THEN REMOVE AND LEAVE OFF FOR 12 HOURS, Disp: , Rfl:     acetaminophen (TYLENOL) 500 MG tablet, Take 1,000 mg by mouth every 6 hours as needed for Pain (for minimal pain (1-3/10)), Disp: , Rfl:     No Known Allergies    Social History     Socioeconomic History    Marital status: Single     Spouse name: Not on file    Number of children: Not on file    Years of education: Not on file    Highest education level: Not on file   Occupational History    Not on file   Tobacco Use    Smoking status: Never    Smokeless tobacco: Never   Vaping Use    Vaping Use: Never used   Substance and Sexual Activity    Alcohol use: Yes     Comment: Socially    Drug use: Never    Sexual activity: Yes     Partners: Male   Other Topics Concern    Not on file   Social History Narrative    Not on file     Social Determinants of Health     Financial Resource Strain: Not on file   Food Insecurity: Not on file   Transportation Needs: No Transportation Needs (2/29/2024)    OASIS : Transportation     Lack of Transportation (Medical): No     Lack of Transportation (Non-Medical): No     Patient Unable or Declines to Respond: No   Physical Activity: Not on file   Stress: Not on file   Social Connections: Feeling Socially Integrated (2/29/2024)    OASIS : Social

## 2024-04-05 ENCOUNTER — HOSPITAL ENCOUNTER (OUTPATIENT)
Facility: HOSPITAL | Age: 54
Discharge: HOME | End: 2024-04-05
Payer: COMMERCIAL

## 2024-04-05 VITALS — WEIGHT: 285 LBS | BODY MASS INDEX: 40.8 KG/M2 | HEIGHT: 70 IN

## 2024-04-05 DIAGNOSIS — Z96.651 PRESENCE OF RIGHT ARTIFICIAL KNEE JOINT: Primary | ICD-10-CM

## 2024-04-05 DIAGNOSIS — Z12.31 VISIT FOR SCREENING MAMMOGRAM: ICD-10-CM

## 2024-04-05 DIAGNOSIS — G89.18 POST-OP PAIN: ICD-10-CM

## 2024-04-05 PROCEDURE — 77063 BREAST TOMOSYNTHESIS BI: CPT

## 2024-04-05 RX ORDER — OXYCODONE HYDROCHLORIDE AND ACETAMINOPHEN 5; 325 MG/1; MG/1
1 TABLET ORAL EVERY 4 HOURS PRN
Qty: 42 TABLET | Refills: 0 | Status: SHIPPED | OUTPATIENT
Start: 2024-04-05 | End: 2024-04-12

## 2024-04-05 NOTE — TELEPHONE ENCOUNTER
Patient is asking for a refill of Oxycodone to be sent to the [facility] pharmacy on [facility].    Patient can be reached at [phone].

## 2024-04-15 ENCOUNTER — TELEPHONE (OUTPATIENT)
Age: 54
End: 2024-04-15

## 2024-04-15 DIAGNOSIS — G89.18 POST-OP PAIN: Primary | ICD-10-CM

## 2024-04-15 DIAGNOSIS — Z96.651 PRESENCE OF RIGHT ARTIFICIAL KNEE JOINT: ICD-10-CM

## 2024-04-15 NOTE — TELEPHONE ENCOUNTER
Patient is requesting a refill for HYDROcodone-acetaminophen (NORCO) 5-325 MG per tablet.    [facility]/pharmacy #[id number] - [place], [place] - [address] - P [phone] - F [phone]      Patient tel [phone]

## 2024-04-16 RX ORDER — HYDROCODONE BITARTRATE AND ACETAMINOPHEN 5; 325 MG/1; MG/1
1 TABLET ORAL EVERY 6 HOURS PRN
Qty: 28 TABLET | Refills: 0 | Status: SHIPPED | OUTPATIENT
Start: 2024-04-16 | End: 2024-04-23

## 2024-04-25 ENCOUNTER — OFFICE VISIT (OUTPATIENT)
Age: 54
End: 2024-04-25

## 2024-04-25 DIAGNOSIS — M62.81 WEAKNESS OF RIGHT QUADRICEPS MUSCLE: ICD-10-CM

## 2024-04-25 DIAGNOSIS — G89.18 POST-OP PAIN: ICD-10-CM

## 2024-04-25 DIAGNOSIS — Z96.651 PRESENCE OF RIGHT ARTIFICIAL KNEE JOINT: ICD-10-CM

## 2024-04-25 DIAGNOSIS — Z48.89 ENCOUNTER FOR POSTOPERATIVE CARE: Primary | ICD-10-CM

## 2024-04-25 DIAGNOSIS — M54.16 LUMBAR NEURITIS: ICD-10-CM

## 2024-04-25 RX ORDER — METHYLPREDNISOLONE 4 MG/1
TABLET ORAL
Qty: 1 KIT | Refills: 0 | Status: SHIPPED | OUTPATIENT
Start: 2024-04-25

## 2024-04-25 RX ORDER — OXYCODONE HYDROCHLORIDE 5 MG/1
5-10 TABLET ORAL EVERY 8 HOURS PRN
Qty: 42 TABLET | Refills: 0 | Status: SHIPPED | OUTPATIENT
Start: 2024-04-25 | End: 2024-05-02

## 2024-04-25 NOTE — PROGRESS NOTES
FOR 12 HOURS, THEN REMOVE AND LEAVE OFF FOR 12 HOURS, Disp: , Rfl:     acetaminophen (TYLENOL) 500 MG tablet, Take 2 tablets by mouth every 6 hours as needed for Pain (for minimal pain (1-3/10)), Disp: , Rfl:     BD PEN NEEDLE RODNEY 2ND GEN 32G X 4 MM MISC, USE ONCE DAILY WITH VICTOZA, Disp: , Rfl:     No Known Allergies    Social History     Socioeconomic History    Marital status: Single     Spouse name: Not on file    Number of children: Not on file    Years of education: Not on file    Highest education level: Not on file   Occupational History    Not on file   Tobacco Use    Smoking status: Never    Smokeless tobacco: Never   Vaping Use    Vaping Use: Never used   Substance and Sexual Activity    Alcohol use: Yes     Comment: Socially    Drug use: Never    Sexual activity: Yes     Partners: Male   Other Topics Concern    Not on file   Social History Narrative    Not on file     Social Determinants of Health     Financial Resource Strain: Not on file   Food Insecurity: Not on file   Transportation Needs: No Transportation Needs (2/29/2024)    OASIS : Transportation     Lack of Transportation (Medical): No     Lack of Transportation (Non-Medical): No     Patient Unable or Declines to Respond: No   Physical Activity: Not on file   Stress: Not on file   Social Connections: Feeling Socially Integrated (2/29/2024)    OASIS : Social Isolation     Frequency of experiencing loneliness or isolation: Never   Intimate Partner Violence: Not on file   Housing Stability: Not on file       Past Surgical History:   Procedure Laterality Date    CHOLECYSTECTOMY      GASTRIC BYPASS SURGERY      TOTAL KNEE ARTHROPLASTY Left 10/11/2023    LEFT TOTAL KNEE ARTHROPLASTY; [GOULD&NEPHEW ORTHO]; OSBORNE TO ASSIST; NERVE BLOCK; 23 HR performed by Ramesh De Paz MD at [facility] MAIN OR    TOTAL KNEE ARTHROPLASTY Right 2/12/2024    RIGHT TOTAL KNEE REPLACEMENT performed by Ramesh De Paz MD at [facility] MAIN OR         Patient seen evaluated

## 2024-04-26 ENCOUNTER — HOSPITAL ENCOUNTER (OUTPATIENT)
Facility: HOSPITAL | Age: 54
Discharge: HOME | End: 2024-04-29

## 2024-04-26 LAB — SENTARA SPECIMEN COLLECTION: NORMAL

## 2024-04-26 PROCEDURE — 99001 SPECIMEN HANDLING PT-LAB: CPT

## 2024-04-29 LAB
BASOPHILS # BLD: 1 % (ref 0–2)
BASOPHILS ABSOLUTE: 0 K/UL (ref 0–0.2)
C-REACTIVE PROTEIN: <0.5 MG/DL
CCP ANTIBODY IGG: 5.2 U/ML
EOSINOPHIL # BLD: 7 % (ref 0–6)
EOSINOPHILS ABSOLUTE: 0.2 K/UL (ref 0–0.5)
ERYTHROCYTE SEDIMENTATION RATE: 34 MM/HR
HCT VFR BLD CALC: 37.9 % (ref 35.1–48)
HEMOGLOBIN: 10.9 G/DL (ref 11.7–16)
INTERLEUKIN-6: 2.7 PG/ML (ref 0–7)
LYMPHOCYTES # BLD: 36 % (ref 20–45)
LYMPHOCYTES ABSOLUTE: 1.2 K/UL (ref 1–4.8)
MCH RBC QN AUTO: 26 PG (ref 26–34)
MCHC RBC AUTO-ENTMCNC: 29 G/DL (ref 31–36)
MCV RBC AUTO: 90 FL (ref 80–99)
MONOCYTES ABSOLUTE: 0.2 K/UL (ref 0.1–1)
MONOCYTES: 5 % (ref 3–12)
NEUTROPHILS ABSOLUTE: 1.7 K/UL (ref 1.8–7.7)
NEUTROPHILS: 51 % (ref 40–75)
PDW BLD-RTO: 14.1 % (ref 10–15.5)
PLATELET # BLD: 226 K/UL (ref 140–440)
PMV BLD AUTO: 12.8 FL (ref 9–13)
RBC: 4.2 M/UL (ref 3.8–5.2)
URIC ACID: 3.2 MG/DL (ref 2.2–7.7)
WBC: 3.3 K/UL (ref 4–11)

## 2024-05-09 ENCOUNTER — HOSPITAL ENCOUNTER (OUTPATIENT)
Facility: HOSPITAL | Age: 54
Discharge: HOME | End: 2024-05-12

## 2024-05-09 ENCOUNTER — TELEPHONE (OUTPATIENT)
Age: 54
End: 2024-05-09

## 2024-05-09 ENCOUNTER — OFFICE VISIT (OUTPATIENT)
Age: 54
End: 2024-05-09

## 2024-05-09 DIAGNOSIS — Z96.651 PRESENCE OF RIGHT ARTIFICIAL KNEE JOINT: Primary | ICD-10-CM

## 2024-05-09 DIAGNOSIS — M25.561 PAIN AND SWELLING OF RIGHT KNEE: ICD-10-CM

## 2024-05-09 DIAGNOSIS — Z96.652 PRESENCE OF LEFT ARTIFICIAL KNEE JOINT: ICD-10-CM

## 2024-05-09 DIAGNOSIS — G89.18 POST-OP PAIN: ICD-10-CM

## 2024-05-09 DIAGNOSIS — M54.16 LUMBAR RADICULOPATHY: ICD-10-CM

## 2024-05-09 DIAGNOSIS — R76.8 CYCLIC CITRULLINATED PEPTIDE (CCP) ANTIBODY POSITIVE: ICD-10-CM

## 2024-05-09 DIAGNOSIS — M25.461 PAIN AND SWELLING OF RIGHT KNEE: ICD-10-CM

## 2024-05-09 DIAGNOSIS — R70.0 ELEVATED SEDIMENTATION RATE: ICD-10-CM

## 2024-05-09 DIAGNOSIS — Z96.651 PRESENCE OF RIGHT ARTIFICIAL KNEE JOINT: ICD-10-CM

## 2024-05-09 LAB — SENTARA SPECIMEN COLLECTION: NORMAL

## 2024-05-09 PROCEDURE — 99024 POSTOP FOLLOW-UP VISIT: CPT | Performed by: PHYSICIAN ASSISTANT

## 2024-05-09 PROCEDURE — 99001 SPECIMEN HANDLING PT-LAB: CPT

## 2024-05-09 RX ORDER — HYDROCODONE BITARTRATE AND ACETAMINOPHEN 5; 325 MG/1; MG/1
1 TABLET ORAL EVERY 8 HOURS PRN
Qty: 21 TABLET | Refills: 0 | Status: SHIPPED | OUTPATIENT
Start: 2024-05-09 | End: 2024-05-16

## 2024-05-09 NOTE — TELEPHONE ENCOUNTER
Patient wants to know if JR will please complete her prior auth for her HYDROcodone-acetaminophen (NORCO) 5-325 MG per tablet     [facility]/pharmacy #[id number] - [place], [place] - [address] - [phone] - F [phone]     Patient tel [phone]

## 2024-05-09 NOTE — PROGRESS NOTES
Patient: Aleida Magdaleno                MRN: [id number]       SSN: [id number]  YOB: 1970        AGE: 54 y.o.        SEX: female  There is no height or weight on file to calculate BMI.    PCP: Mimi Huffman PA  05/09/24      This office note has been dictated.      REVIEW OF SYSTEMS:  Constitutional: Negative for fever, chills, weight loss and malaise/fatigue.   HENT: Negative.    Eyes: Negative.    Respiratory: Negative.   Cardiovascular: Negative.   Gastrointestinal: No bowel incontinence or constipation.  Genitourinary: No bladder incontinence or saddle anesthesia.  Skin: Negative.   Neurological: Negative.    Endo/Heme/Allergies: Negative.    Psychiatric/Behavioral: Negative.  Musculoskeletal: As per HPI above.     Past Medical History:   Diagnosis Date    DVT (deep venous thrombosis) (HCC) 2003    History of pulmonary embolus (PE) 12/2017    bilateral    Hx of blood clots          Current Outpatient Medications:     methylPREDNISolone (MEDROL DOSEPACK) 4 MG tablet, Follow package directions, Disp: 1 kit, Rfl: 0    naloxone (NARCAN) 4 MG/0.1ML LIQD nasal spray, 1 spray by Nasal route as needed for Opioid Reversal, Disp: 1 each, Rfl: 1    aspirin (ASPIRIN 81) 81 MG EC tablet, Take 1 tablet by mouth in the morning and at bedtime, Disp: 60 tablet, Rfl: 3    celecoxib (CELEBREX) 200 MG capsule, Take 1 capsule by mouth 2 times daily, Disp: 60 capsule, Rfl: 2    naloxone (NARCAN) 4 MG/0.1ML LIQD nasal spray, 1 spray by Nasal route as needed for Opioid Reversal, Disp: 1 each, Rfl: 1    ondansetron (ZOFRAN) 4 MG tablet, Take 1 tablet by mouth every 8 hours as needed for Nausea or Vomiting, Disp: 30 tablet, Rfl: 2    celecoxib (CELEBREX) 200 MG capsule, Take 1 capsule by mouth 2 times daily, Disp: 60 capsule, Rfl: 2    Ferrous Sulfate (IRON PO), Take by mouth daily, Disp: , Rfl:     chlorhexidine (PERIDEX) 0.12 % solution, RINSE MOUTH WITH 15ML (1 CAPFUL) FOR 30 SECONDS IN MORNING AND EVENING

## 2024-05-09 NOTE — TELEPHONE ENCOUNTER
This prior auth is unable to be performed on Cover My Meds. The form has to be printed and completed and faxed back for review.    Called and spoke to patient. She states she is completely out of medication. I advised that since JR is out of office until Wednesday of next week, she should call her pharmacy and ask if she can pay for medication out of pocket.

## 2024-05-10 LAB
ANTI-DNA (DS) AB QN: <1 IU/ML
CENTROMERE B AB: <0.2 AI
CHROMATIN ANTIBODY: <0.2 AI
JO-1 ANTIBODY: <0.2 AI
RHEUMATOID FACTOR QUANT, IMMUNOTURBIDIMETRIC: <20 IU/ML (ref 0–20)
RNP ANTIBODY: <0.2 AI
SCLERODERMA (SCL-70) AB: 0.3 AI
SJOGREN'S ANTI-SS-A: 5.3 AI
SJOGREN'S ANTI-SS-B: <0.2 AI
SMITH ABS: <0.2 AI

## 2024-05-16 ENCOUNTER — TELEPHONE (OUTPATIENT)
Age: 54
End: 2024-05-16

## 2024-05-16 DIAGNOSIS — G89.18 POST-OP PAIN: Primary | ICD-10-CM

## 2024-05-16 DIAGNOSIS — Z96.651 PRESENCE OF RIGHT ARTIFICIAL KNEE JOINT: ICD-10-CM

## 2024-05-16 DIAGNOSIS — Z96.652 PRESENCE OF LEFT ARTIFICIAL KNEE JOINT: ICD-10-CM

## 2024-05-16 NOTE — TELEPHONE ENCOUNTER
Patient called and said that she saw YOVANY Sanderson on 5/9/24 for her right knee, that he is placing an order to have a test done on the knee.     Patient is asking if he could also place an order for her to have the same test done on her Chest. That she has had a Blood Clot in her Lungs before, and she just wants to have that checked as well.     Patient also said that she needs a refill on the Hydrocodone medication .    [facility] on [address]   Tel. [phone].    Patient tel. [phone].

## 2024-05-17 NOTE — TELEPHONE ENCOUNTER
Patient notified of previous message in regards to medication and diagnostic testing. Patient is asking for lab work results.

## 2024-05-17 NOTE — TELEPHONE ENCOUNTER
Patient called back regarding her rx request.     Please advise her once something has been sent to the pharmacy.  Patient can be reached at [phone].      Pharmacy:    [facility]/PHARMACY #[id number] - [place], [place] - [address] - P [phone] - F [phone] [[id number]]

## 2024-05-20 DIAGNOSIS — R76.8 SS-A ANTIBODY POSITIVE: Primary | ICD-10-CM

## 2024-05-20 RX ORDER — TRAMADOL HYDROCHLORIDE 50 MG/1
50 TABLET ORAL EVERY 8 HOURS PRN
Qty: 21 TABLET | Refills: 0 | Status: SHIPPED | OUTPATIENT
Start: 2024-05-20 | End: 2024-05-27

## 2024-05-22 NOTE — PROGRESS NOTES
gluteal musculature).    Range of Motion   Extension:  normal   Flexion:  normal   Rotation right:  normal   Rotation left:  normal     Tests   Straight leg raise right: negative  Straight leg raise left: negative    Reflexes   Patellar:  2/4  Achilles:  1/4  Biceps:  2/4    Other   Toe walk: normal  Heel walk: normal  Sensation: normal        MOTOR:      Elbow Flex  Elbow Ext Arm Abd Wrist Ext Wrist Flex Hand Intrin   Right 5/5 5/5 5/5 5/5 5/5 5/5   Left 5/5 5/5 5/5 5/5 5/5 5/5             Hip flex  Knee Ext EHL Ankle DF Ankle PF      Right 5/5 5/5 5/5 5/5 5/5    Left 5/5 5/5 5/5 5/5 5/5        Ambulation without assistive device. FWB.    ASSESSMENT  Aleida Magdaleno is a 54 y.o. female who c/o lumbar pain radiating into right buttock. Some of her symptoms may derive from myofascial pain compensatory to recent knee replacement. Significant disc space narrowing observed in lumbar X-rays. Will obtain further imaging to rule out neurogenic involvement.      PLAN  Lumbar MRI - for surgical and injection evaluation; Attended PT and maintains HEP with minimal relief.   Celebrex 200mg BID PRN  Flexeril 10 mg TID PRN  Maintain HEP  Discussed lifestyle changes regarding exercise, diet, sleep, and healthy social relationships.    Follow-up and Dispositions    Return in about 5 weeks (around 6/27/2024) for MRI follow up.           Aleida was seen today for lower back pain and hip pain.    Diagnoses and all orders for this visit:    Lumbar radiculopathy  -     [40711] L/S 2-3 views  -     MRI LUMBAR SPINE WO CONTRAST; Future  -     celecoxib (CELEBREX) 200 MG capsule; Take 1 capsule by mouth 2 times daily  -     cyclobenzaprine (FLEXERIL) 10 MG tablet; Take 1 tablet by mouth 3 times daily as needed for Muscle spasms    Lumbar pain  -     MRI LUMBAR SPINE WO CONTRAST; Future  -     celecoxib (CELEBREX) 200 MG capsule; Take 1 capsule by mouth 2 times daily  -     cyclobenzaprine (FLEXERIL) 10 MG tablet; Take 1 tablet by

## 2024-05-23 ENCOUNTER — TELEPHONE (OUTPATIENT)
Age: 54
End: 2024-05-23

## 2024-05-23 ENCOUNTER — OFFICE VISIT (OUTPATIENT)
Age: 54
End: 2024-05-23

## 2024-05-23 VITALS
BODY MASS INDEX: 40.89 KG/M2 | DIASTOLIC BLOOD PRESSURE: 60 MMHG | HEIGHT: 70 IN | RESPIRATION RATE: 15 BRPM | SYSTOLIC BLOOD PRESSURE: 129 MMHG | HEART RATE: 56 BPM

## 2024-05-23 DIAGNOSIS — M79.18 MYOFASCIAL PAIN: ICD-10-CM

## 2024-05-23 DIAGNOSIS — Z96.653 STATUS POST BILATERAL KNEE REPLACEMENTS: ICD-10-CM

## 2024-05-23 DIAGNOSIS — M54.50 LUMBAR PAIN: ICD-10-CM

## 2024-05-23 DIAGNOSIS — M54.16 LUMBAR RADICULOPATHY: Primary | ICD-10-CM

## 2024-05-23 DIAGNOSIS — G89.18 POST-OP PAIN: Primary | ICD-10-CM

## 2024-05-23 DIAGNOSIS — Z96.652 PRESENCE OF LEFT ARTIFICIAL KNEE JOINT: ICD-10-CM

## 2024-05-23 RX ORDER — CELECOXIB 200 MG/1
200 CAPSULE ORAL 2 TIMES DAILY
Qty: 60 CAPSULE | Refills: 2 | Status: SHIPPED | OUTPATIENT
Start: 2024-05-23 | End: 2024-08-21

## 2024-05-23 RX ORDER — CYCLOBENZAPRINE HCL 10 MG
10 TABLET ORAL 3 TIMES DAILY PRN
Qty: 90 TABLET | Refills: 2 | Status: SHIPPED | OUTPATIENT
Start: 2024-05-23 | End: 2024-08-21

## 2024-05-23 RX ORDER — HYDROCODONE BITARTRATE AND ACETAMINOPHEN 5; 325 MG/1; MG/1
1 TABLET ORAL EVERY 8 HOURS PRN
Qty: 21 TABLET | Refills: 0 | Status: SHIPPED | OUTPATIENT
Start: 2024-05-23 | End: 2024-05-30

## 2024-05-23 ASSESSMENT — ENCOUNTER SYMPTOMS
SHORTNESS OF BREATH: 0
VOMITING: 0
BACK PAIN: 1
WHEEZING: 0
NAUSEA: 0
TROUBLE SWALLOWING: 0

## 2024-05-23 NOTE — TELEPHONE ENCOUNTER
Norco rx done and sent to pharmacy  No further pain meds will be prescribed, please notify patient.

## 2024-05-23 NOTE — TELEPHONE ENCOUNTER
Patient called and said that the pharmacy told her that she needs Prior Auth for the Tramadol medication.    Patient said that her insurance has approved the Hydrocodone medication, but since her medication was switched from Hydrocodone to Tramadol, prior auth is needed.     Patient said she is out of pain medication. That she does not know if YOVANY Sanderson wants to place her back on Hydrocodone or not, because she can not be without pain medication this weekend.       [facility] on [facility] tel. [phone]    Patient tel. [phone].

## 2024-05-23 NOTE — TELEPHONE ENCOUNTER
Spoke with patient and informed her meds were sent into pharmacy and tht no further meds will be sent in per YOVANY Sanderson

## 2024-05-24 ENCOUNTER — HOSPITAL ENCOUNTER (OUTPATIENT)
Facility: HOSPITAL | Age: 54
Discharge: HOME | End: 2024-05-24
Payer: COMMERCIAL

## 2024-05-24 DIAGNOSIS — M25.561 PAIN AND SWELLING OF RIGHT KNEE: ICD-10-CM

## 2024-05-24 DIAGNOSIS — M25.461 PAIN AND SWELLING OF RIGHT KNEE: ICD-10-CM

## 2024-05-24 PROCEDURE — 93971 EXTREMITY STUDY: CPT

## 2024-05-28 ENCOUNTER — TELEPHONE (OUTPATIENT)
Age: 54
End: 2024-05-28

## 2024-05-28 PROCEDURE — 93971 EXTREMITY STUDY: CPT | Performed by: SURGERY

## 2024-05-28 NOTE — TELEPHONE ENCOUNTER
Patient called after speaking to [facility]. They received our referral but did not receive lab results. Patient is asking that we fax over her lab results so that she can be scheduled.    Fax: [phone]

## 2024-08-13 DIAGNOSIS — M54.50 LUMBAR PAIN: ICD-10-CM

## 2024-08-13 DIAGNOSIS — M79.18 MYOFASCIAL PAIN: ICD-10-CM

## 2024-08-13 DIAGNOSIS — Z96.653 STATUS POST BILATERAL KNEE REPLACEMENTS: ICD-10-CM

## 2024-08-13 DIAGNOSIS — M54.16 LUMBAR RADICULOPATHY: ICD-10-CM

## 2024-08-14 RX ORDER — CELECOXIB 200 MG/1
200 CAPSULE ORAL 2 TIMES DAILY
Qty: 60 CAPSULE | Refills: 2 | Status: SHIPPED | OUTPATIENT
Start: 2024-08-14

## 2024-09-07 DIAGNOSIS — M54.16 LUMBAR RADICULOPATHY: ICD-10-CM

## 2024-09-07 DIAGNOSIS — M79.18 MYOFASCIAL PAIN: ICD-10-CM

## 2024-09-07 DIAGNOSIS — M54.50 LUMBAR PAIN: ICD-10-CM

## 2024-09-09 RX ORDER — CYCLOBENZAPRINE HCL 10 MG
TABLET ORAL
Qty: 90 TABLET | Refills: 2 | OUTPATIENT
Start: 2024-09-09

## 2024-09-17 DIAGNOSIS — M79.18 MYOFASCIAL PAIN: ICD-10-CM

## 2024-09-17 DIAGNOSIS — M54.16 LUMBAR RADICULOPATHY: ICD-10-CM

## 2024-09-17 DIAGNOSIS — M54.50 LUMBAR PAIN: ICD-10-CM

## 2024-09-17 RX ORDER — CYCLOBENZAPRINE HCL 10 MG
TABLET ORAL
Qty: 90 TABLET | Refills: 2 | OUTPATIENT
Start: 2024-09-17

## 2024-11-06 DIAGNOSIS — Z96.653 STATUS POST BILATERAL KNEE REPLACEMENTS: ICD-10-CM

## 2024-11-06 DIAGNOSIS — M54.16 LUMBAR RADICULOPATHY: ICD-10-CM

## 2024-11-06 DIAGNOSIS — M79.18 MYOFASCIAL PAIN: ICD-10-CM

## 2024-11-06 DIAGNOSIS — M54.50 LUMBAR PAIN: ICD-10-CM

## 2024-11-06 RX ORDER — CELECOXIB 200 MG/1
200 CAPSULE ORAL 2 TIMES DAILY
Qty: 60 CAPSULE | Refills: 2 | OUTPATIENT
Start: 2024-11-06

## 2024-11-12 ENCOUNTER — TELEPHONE (OUTPATIENT)
Age: 54
End: 2024-11-12

## 2024-11-12 NOTE — TELEPHONE ENCOUNTER
Had elevated inflammatory marker at her last visit with me.      Elevated CCP antibodies and sed rate.      Rheumatology referral to rule out auto-immune disorders

## 2024-11-12 NOTE — TELEPHONE ENCOUNTER
Pt would like to know what is she going to the [facility] for?  Pt requesting a callback.    callback # [phone]

## 2024-12-12 ENCOUNTER — OFFICE VISIT (OUTPATIENT)
Age: 54
End: 2024-12-12

## 2024-12-12 VITALS — HEIGHT: 69 IN | BODY MASS INDEX: 43.4 KG/M2 | WEIGHT: 293 LBS

## 2024-12-12 DIAGNOSIS — Z96.651 PRESENCE OF RIGHT ARTIFICIAL KNEE JOINT: Primary | ICD-10-CM

## 2024-12-12 DIAGNOSIS — Z96.652 PRESENCE OF LEFT ARTIFICIAL KNEE JOINT: ICD-10-CM

## 2024-12-12 RX ORDER — CELECOXIB 200 MG/1
200 CAPSULE ORAL 2 TIMES DAILY
Qty: 60 CAPSULE | Refills: 2 | Status: SHIPPED | OUTPATIENT
Start: 2024-12-12

## 2025-04-01 ENCOUNTER — TRANSCRIBE ORDERS (OUTPATIENT)
Facility: HOSPITAL | Age: 55
End: 2025-04-01

## 2025-04-01 DIAGNOSIS — Z12.31 BREAST CANCER SCREENING BY MAMMOGRAM: Primary | ICD-10-CM

## 2025-06-09 ENCOUNTER — HOSPITAL ENCOUNTER (OUTPATIENT)
Facility: HOSPITAL | Age: 55
Discharge: HOME | End: 2025-06-12
Payer: COMMERCIAL

## 2025-06-09 VITALS — BODY MASS INDEX: 39.99 KG/M2 | WEIGHT: 270 LBS | HEIGHT: 69 IN

## 2025-06-09 DIAGNOSIS — Z12.31 BREAST CANCER SCREENING BY MAMMOGRAM: ICD-10-CM

## 2025-06-09 PROCEDURE — 77067 SCR MAMMO BI INCL CAD: CPT

## (undated) DEVICE — PREMIUM DRY TRAY LF: Brand: MEDLINE INDUSTRIES, INC.

## (undated) DEVICE — SYRINGE MED 3ML NDL 22GA L1 1/2IN REG BVL SFGLDE

## (undated) DEVICE — HANDPIECE SET WITH HIGH FLOW TIP AND SUCTION TUBE: Brand: INTERPULSE

## (undated) DEVICE — TAPE,CLOTH/SILK,CURAD,3"X10YD,LF,40/CS: Brand: CURAD

## (undated) DEVICE — STRYKER PERFORMANCE SERIES SAGITTAL BLADE: Brand: STRYKER PERFORMANCE SERIES

## (undated) DEVICE — SUTURE MCRYL SZ 2-0 L36IN ABSRB UD L36MM CT-1 1/2 CIR Y945H

## (undated) DEVICE — SUTURE VCRL SZ 2 L27IN ABSRB VLT L65MM TP-1 1/2 CIR J649G

## (undated) DEVICE — INTENDED FOR TISSUE SEPARATION, AND OTHER PROCEDURES THAT REQUIRE A SHARP SURGICAL BLADE TO PUNCTURE OR CUT.: Brand: BARD-PARKER ® STAINLESS STEEL BLADES

## (undated) DEVICE — BOWL AND CEMENT CARTRIDGE WITH BREAKAWAY FEMORAL NOZZLE: Brand: ACM

## (undated) DEVICE — GENESIS PIN AND DRILL SET: Brand: GENESIS

## (undated) DEVICE — HOOD WITH PEEL AWAY FACE SHIELD: Brand: T7PLUS

## (undated) DEVICE — GOWN ,SIRUS ,NONREINFORCED 4XL: Brand: MEDLINE

## (undated) DEVICE — SKIN MARKER,REGULAR TIP WITH RULER AND LABELS: Brand: DEVON

## (undated) DEVICE — SOLUTION IRRIG 1000ML 0.9% SOD CHL USP POUR PLAS BTL

## (undated) DEVICE — PACK SURG BSHR TOT KNEE LF

## (undated) DEVICE — 4-PORT MANIFOLD: Brand: NEPTUNE 2

## (undated) DEVICE — DECANTER BAG 9": Brand: MEDLINE INDUSTRIES, INC.

## (undated) DEVICE — SUTURE ABSORBABLE ANTIBACT 1-0 CT-1 24 IN STRATAFIX PDS + SXPP1A443

## (undated) DEVICE — SET PIN L76MM DIA3.2MM S STL FLUT 127X3.2MM DRL DISP GEN

## (undated) DEVICE — SOLUTION IRRIG 3000ML 0.9% SOD CHL FLX CONT 0797208] ICU MEDICAL INC]

## (undated) DEVICE — 3M™ STERI-DRAPE™ INSTRUMENT POUCH 1018: Brand: STERI-DRAPE™

## (undated) DEVICE — BANDAGE COMPR W6INXL5YD WHT BGE POLY COT M E WRP WV HK AND

## (undated) DEVICE — SUTURE VCRL SZ 0 L36IN ABSRB UD L36MM CT-1 1/2 CIR J946H

## (undated) DEVICE — INTENDED FOR TISSUE SEPARATION, AND OTHER PROCEDURES THAT REQUIRE A SHARP SURGICAL BLADE TO PUNCTURE OR CUT.: Brand: BARD-PARKER ® CARBON RIB-BACK BLADES

## (undated) DEVICE — BIPOLAR SEALER 23-112-1 AQM 6.0: Brand: AQUAMANTYS ®

## (undated) DEVICE — SUTURE VCRL 1 L27IN ABSRB VLT TP-1 L65MM 1/2 CIR TAPERPOINT J650G

## (undated) DEVICE — SYRINGE MED 30ML STD CLR PLAS LUERLOCK TIP N CTRL DISP

## (undated) DEVICE — KIT OR TURNOVER

## (undated) DEVICE — SOLUTION IV 1000ML 0.9% SOD CHL

## (undated) DEVICE — DRESSING HYDROFIBER AQUACEL AG ADVANTAGE 3.5X14 IN

## (undated) DEVICE — APPLICATOR MEDICATED 26 CC SOLUTION HI LT ORNG CHLORAPREP

## (undated) DEVICE — ELECTRODE PT RET AD L9FT HI MOIST COND ADH HYDRGEL CORDED

## (undated) DEVICE — Device: Brand: JELCO

## (undated) DEVICE — ZIMMER® STERILE DISPOSABLE TOURNIQUET CUFF WITH PLC, DUAL PORT, SINGLE BLADDER, 34 IN. (86 CM)

## (undated) DEVICE — GOWN,REINFORCED,POLY,AURORA,XXLARGE,STR: Brand: MEDLINE

## (undated) DEVICE — SPONGE LAP W18XL18IN WHT COT 4 PLY FLD STRUNG RADPQ DISP ST 2 PER PACK

## (undated) DEVICE — Device

## (undated) DEVICE — GENESIS TROCHLEAR PIN 1/8 IN. X 5IN: Brand: GENESIS